# Patient Record
Sex: MALE | Race: WHITE | NOT HISPANIC OR LATINO | Employment: OTHER | URBAN - METROPOLITAN AREA
[De-identification: names, ages, dates, MRNs, and addresses within clinical notes are randomized per-mention and may not be internally consistent; named-entity substitution may affect disease eponyms.]

---

## 2017-01-13 ENCOUNTER — HOSPITAL ENCOUNTER (EMERGENCY)
Facility: HOSPITAL | Age: 77
Discharge: HOME | End: 2017-01-13
Attending: EMERGENCY MEDICINE | Admitting: EMERGENCY MEDICINE
Payer: MEDICARE

## 2017-01-13 ENCOUNTER — APPOINTMENT (EMERGENCY)
Dept: RADIOLOGY | Facility: HOSPITAL | Age: 77
End: 2017-01-13
Payer: MEDICARE

## 2017-01-13 ENCOUNTER — APPOINTMENT (EMERGENCY)
Dept: CT IMAGING | Facility: HOSPITAL | Age: 77
End: 2017-01-13
Payer: MEDICARE

## 2017-01-13 VITALS
HEIGHT: 66 IN | WEIGHT: 175 LBS | OXYGEN SATURATION: 98 % | BODY MASS INDEX: 28.12 KG/M2 | RESPIRATION RATE: 12 BRPM | DIASTOLIC BLOOD PRESSURE: 75 MMHG | TEMPERATURE: 98.4 F | SYSTOLIC BLOOD PRESSURE: 154 MMHG | HEART RATE: 63 BPM

## 2017-01-13 DIAGNOSIS — M25.512 LEFT SHOULDER PAIN: ICD-10-CM

## 2017-01-13 DIAGNOSIS — R07.9 CHEST PAIN: Primary | ICD-10-CM

## 2017-01-13 LAB
ANION GAP SERPL CALCULATED.3IONS-SCNC: 9 MMOL/L (ref 4–13)
ATRIAL RATE: 81 BPM
BASOPHILS # BLD AUTO: 0.04 THOUSANDS/ΜL (ref 0–0.1)
BASOPHILS NFR BLD AUTO: 1 % (ref 0–1)
BUN SERPL-MCNC: 17 MG/DL (ref 5–25)
CALCIUM SERPL-MCNC: 9.4 MG/DL (ref 8.3–10.1)
CHLORIDE SERPL-SCNC: 101 MMOL/L (ref 100–108)
CO2 SERPL-SCNC: 29 MMOL/L (ref 21–32)
CREAT SERPL-MCNC: 1.15 MG/DL (ref 0.6–1.3)
EOSINOPHIL # BLD AUTO: 0.28 THOUSAND/ΜL (ref 0–0.61)
EOSINOPHIL NFR BLD AUTO: 3 % (ref 0–6)
ERYTHROCYTE [DISTWIDTH] IN BLOOD BY AUTOMATED COUNT: 16 % (ref 11.6–15.1)
GFR SERPL CREATININE-BSD FRML MDRD: >60 ML/MIN/1.73SQ M
GLUCOSE SERPL-MCNC: 116 MG/DL (ref 65–140)
HCT VFR BLD AUTO: 46.7 % (ref 36.5–49.3)
HGB BLD-MCNC: 15.2 G/DL (ref 12–17)
LYMPHOCYTES # BLD AUTO: 2.27 THOUSANDS/ΜL (ref 0.6–4.47)
LYMPHOCYTES NFR BLD AUTO: 27 % (ref 14–44)
MCH RBC QN AUTO: 24.9 PG (ref 26.8–34.3)
MCHC RBC AUTO-ENTMCNC: 32.5 G/DL (ref 31.4–37.4)
MCV RBC AUTO: 76 FL (ref 82–98)
MONOCYTES # BLD AUTO: 0.95 THOUSAND/ΜL (ref 0.17–1.22)
MONOCYTES NFR BLD AUTO: 11 % (ref 4–12)
NEUTROPHILS # BLD AUTO: 4.98 THOUSANDS/ΜL (ref 1.85–7.62)
NEUTS SEG NFR BLD AUTO: 58 % (ref 43–75)
P AXIS: 59 DEGREES
PLATELET # BLD AUTO: 229 THOUSANDS/UL (ref 149–390)
PMV BLD AUTO: 12 FL (ref 8.9–12.7)
POTASSIUM SERPL-SCNC: 4.2 MMOL/L (ref 3.5–5.3)
PR INTERVAL: 154 MS
QRS AXIS: -72 DEGREES
QRSD INTERVAL: 88 MS
QT INTERVAL: 382 MS
QTC INTERVAL: 443 MS
RBC # BLD AUTO: 6.11 MILLION/UL (ref 3.88–5.62)
SODIUM SERPL-SCNC: 139 MMOL/L (ref 136–145)
T WAVE AXIS: 36 DEGREES
TROPONIN I SERPL-MCNC: <0.02 NG/ML
VENTRICULAR RATE: 81 BPM
WBC # BLD AUTO: 8.52 THOUSAND/UL (ref 4.31–10.16)

## 2017-01-13 PROCEDURE — 84484 ASSAY OF TROPONIN QUANT: CPT | Performed by: EMERGENCY MEDICINE

## 2017-01-13 PROCEDURE — 85025 COMPLETE CBC W/AUTO DIFF WBC: CPT | Performed by: EMERGENCY MEDICINE

## 2017-01-13 PROCEDURE — 71275 CT ANGIOGRAPHY CHEST: CPT

## 2017-01-13 PROCEDURE — 71020 HB CHEST X-RAY 2VW FRONTAL&LATL: CPT

## 2017-01-13 PROCEDURE — 99285 EMERGENCY DEPT VISIT HI MDM: CPT

## 2017-01-13 PROCEDURE — 36415 COLL VENOUS BLD VENIPUNCTURE: CPT | Performed by: EMERGENCY MEDICINE

## 2017-01-13 PROCEDURE — 93005 ELECTROCARDIOGRAM TRACING: CPT | Performed by: EMERGENCY MEDICINE

## 2017-01-13 PROCEDURE — 80048 BASIC METABOLIC PNL TOTAL CA: CPT | Performed by: EMERGENCY MEDICINE

## 2017-01-13 RX ORDER — CARVEDILOL 12.5 MG/1
12.5 TABLET ORAL 2 TIMES DAILY WITH MEALS
COMMUNITY
End: 2018-11-15 | Stop reason: SDUPTHER

## 2017-01-13 RX ADMIN — IOHEXOL 70 ML: 350 INJECTION, SOLUTION INTRAVENOUS at 16:36

## 2017-01-13 RX ADMIN — SODIUM CHLORIDE 1000 ML: 0.9 INJECTION, SOLUTION INTRAVENOUS at 17:22

## 2017-01-25 ENCOUNTER — HOSPITAL ENCOUNTER (OUTPATIENT)
Facility: HOSPITAL | Age: 77
Setting detail: OUTPATIENT SURGERY
Discharge: HOME | End: 2017-01-25
Attending: INTERNAL MEDICINE | Admitting: INTERNAL MEDICINE
Payer: MEDICARE

## 2017-01-25 ENCOUNTER — ANESTHESIA EVENT (OUTPATIENT)
Dept: GASTROENTEROLOGY | Facility: HOSPITAL | Age: 77
End: 2017-01-25
Payer: MEDICARE

## 2017-01-25 ENCOUNTER — ANESTHESIA (OUTPATIENT)
Dept: GASTROENTEROLOGY | Facility: HOSPITAL | Age: 77
End: 2017-01-25
Payer: MEDICARE

## 2017-01-25 VITALS
TEMPERATURE: 98.4 F | RESPIRATION RATE: 15 BRPM | SYSTOLIC BLOOD PRESSURE: 110 MMHG | OXYGEN SATURATION: 95 % | HEART RATE: 61 BPM | BODY MASS INDEX: 28.12 KG/M2 | HEIGHT: 66 IN | WEIGHT: 175 LBS | DIASTOLIC BLOOD PRESSURE: 61 MMHG

## 2017-01-25 DIAGNOSIS — K92.1 MELENA: ICD-10-CM

## 2017-01-25 DIAGNOSIS — K59.00 CONSTIPATION: ICD-10-CM

## 2017-01-25 DIAGNOSIS — K21.9 GERD (GASTROESOPHAGEAL REFLUX DISEASE): ICD-10-CM

## 2017-01-25 PROCEDURE — 88305 TISSUE EXAM BY PATHOLOGIST: CPT | Performed by: INTERNAL MEDICINE

## 2017-01-25 RX ORDER — SODIUM CHLORIDE 9 MG/ML
125 INJECTION, SOLUTION INTRAVENOUS CONTINUOUS
Status: DISCONTINUED | OUTPATIENT
Start: 2017-01-25 | End: 2017-01-30 | Stop reason: HOSPADM

## 2017-01-25 RX ORDER — SODIUM CHLORIDE 9 MG/ML
125 INJECTION, SOLUTION INTRAVENOUS CONTINUOUS
Status: CANCELLED | OUTPATIENT
Start: 2017-01-25

## 2017-01-25 RX ORDER — PROPOFOL 10 MG/ML
INJECTION, EMULSION INTRAVENOUS AS NEEDED
Status: DISCONTINUED | OUTPATIENT
Start: 2017-01-25 | End: 2017-01-25 | Stop reason: SURG

## 2017-01-25 RX ORDER — PROPOFOL 10 MG/ML
INJECTION, EMULSION INTRAVENOUS CONTINUOUS PRN
Status: DISCONTINUED | OUTPATIENT
Start: 2017-01-25 | End: 2017-01-25 | Stop reason: SURG

## 2017-01-25 RX ORDER — ONDANSETRON 2 MG/ML
4 INJECTION INTRAMUSCULAR; INTRAVENOUS ONCE
Status: DISCONTINUED | OUTPATIENT
Start: 2017-01-25 | End: 2017-01-30 | Stop reason: HOSPADM

## 2017-01-25 RX ADMIN — SODIUM CHLORIDE: 0.9 INJECTION, SOLUTION INTRAVENOUS at 14:27

## 2017-01-25 RX ADMIN — PROPOFOL 100 MCG/KG/MIN: 10 INJECTION, EMULSION INTRAVENOUS at 14:28

## 2017-01-25 RX ADMIN — LIDOCAINE HYDROCHLORIDE 100 MG: 20 INJECTION, SOLUTION INTRAVENOUS at 14:27

## 2017-01-25 RX ADMIN — PROPOFOL 100 MG: 10 INJECTION, EMULSION INTRAVENOUS at 14:28

## 2017-04-11 ENCOUNTER — ALLSCRIPTS OFFICE VISIT (OUTPATIENT)
Dept: OTHER | Facility: OTHER | Age: 77
End: 2017-04-11

## 2017-09-11 DIAGNOSIS — I25.10 ATHEROSCLEROTIC HEART DISEASE OF NATIVE CORONARY ARTERY WITHOUT ANGINA PECTORIS: ICD-10-CM

## 2017-10-03 ENCOUNTER — HOSPITAL ENCOUNTER (INPATIENT)
Facility: HOSPITAL | Age: 77
LOS: 1 days | Discharge: HOME | DRG: 392 | End: 2017-10-05
Attending: EMERGENCY MEDICINE | Admitting: INTERNAL MEDICINE
Payer: MEDICARE

## 2017-10-03 ENCOUNTER — APPOINTMENT (EMERGENCY)
Dept: RADIOLOGY | Facility: HOSPITAL | Age: 77
DRG: 392 | End: 2017-10-03
Payer: MEDICARE

## 2017-10-03 ENCOUNTER — APPOINTMENT (EMERGENCY)
Dept: CT IMAGING | Facility: HOSPITAL | Age: 77
DRG: 392 | End: 2017-10-03
Payer: MEDICARE

## 2017-10-03 ENCOUNTER — GENERIC CONVERSION - ENCOUNTER (OUTPATIENT)
Dept: OTHER | Facility: OTHER | Age: 77
End: 2017-10-03

## 2017-10-03 DIAGNOSIS — I20.0 UNSTABLE ANGINA (HCC): Primary | ICD-10-CM

## 2017-10-03 LAB
ALBUMIN SERPL BCP-MCNC: 3.6 G/DL (ref 3.5–5)
ALBUMIN SERPL BCP-MCNC: 3.6 G/DL (ref 3.5–5)
ALP SERPL-CCNC: 49 U/L (ref 46–116)
ALP SERPL-CCNC: 53 U/L (ref 46–116)
ALT SERPL W P-5'-P-CCNC: 28 U/L (ref 12–78)
ALT SERPL W P-5'-P-CCNC: 29 U/L (ref 12–78)
ANION GAP SERPL CALCULATED.3IONS-SCNC: 6 MMOL/L (ref 4–13)
APTT PPP: 31 SECONDS (ref 23–35)
AST SERPL W P-5'-P-CCNC: 16 U/L (ref 5–45)
AST SERPL W P-5'-P-CCNC: 16 U/L (ref 5–45)
BASOPHILS # BLD AUTO: 0.05 THOUSANDS/ΜL (ref 0–0.1)
BASOPHILS NFR BLD AUTO: 1 % (ref 0–1)
BILIRUB DIRECT SERPL-MCNC: 0.13 MG/DL (ref 0–0.2)
BILIRUB SERPL-MCNC: 0.4 MG/DL (ref 0.2–1)
BILIRUB SERPL-MCNC: 0.4 MG/DL (ref 0.2–1)
BUN SERPL-MCNC: 19 MG/DL (ref 5–25)
CALCIUM SERPL-MCNC: 9 MG/DL (ref 8.3–10.1)
CHLORIDE SERPL-SCNC: 106 MMOL/L (ref 100–108)
CO2 SERPL-SCNC: 29 MMOL/L (ref 21–32)
CREAT SERPL-MCNC: 1.13 MG/DL (ref 0.6–1.3)
EOSINOPHIL # BLD AUTO: 0.48 THOUSAND/ΜL (ref 0–0.61)
EOSINOPHIL NFR BLD AUTO: 5 % (ref 0–6)
ERYTHROCYTE [DISTWIDTH] IN BLOOD BY AUTOMATED COUNT: 17.7 % (ref 11.6–15.1)
GFR SERPL CREATININE-BSD FRML MDRD: 62 ML/MIN/1.73SQ M
GLUCOSE SERPL-MCNC: 99 MG/DL (ref 65–140)
HCT VFR BLD AUTO: 42.9 % (ref 36.5–49.3)
HGB BLD-MCNC: 14.2 G/DL (ref 12–17)
INR PPP: 0.98 (ref 0.86–1.16)
LIPASE SERPL-CCNC: 80 U/L (ref 73–393)
LYMPHOCYTES # BLD AUTO: 2.43 THOUSANDS/ΜL (ref 0.6–4.47)
LYMPHOCYTES NFR BLD AUTO: 27 % (ref 14–44)
MCH RBC QN AUTO: 25.3 PG (ref 26.8–34.3)
MCHC RBC AUTO-ENTMCNC: 33.1 G/DL (ref 31.4–37.4)
MCV RBC AUTO: 77 FL (ref 82–98)
MONOCYTES # BLD AUTO: 0.98 THOUSAND/ΜL (ref 0.17–1.22)
MONOCYTES NFR BLD AUTO: 11 % (ref 4–12)
NEUTROPHILS # BLD AUTO: 5 THOUSANDS/ΜL (ref 1.85–7.62)
NEUTS SEG NFR BLD AUTO: 56 % (ref 43–75)
PLATELET # BLD AUTO: 185 THOUSANDS/UL (ref 149–390)
PMV BLD AUTO: 12.6 FL (ref 8.9–12.7)
POTASSIUM SERPL-SCNC: 3.5 MMOL/L (ref 3.5–5.3)
PROT SERPL-MCNC: 7.7 G/DL (ref 6.4–8.2)
PROT SERPL-MCNC: 7.8 G/DL (ref 6.4–8.2)
PROTHROMBIN TIME: 12.8 SECONDS (ref 12.1–14.4)
RBC # BLD AUTO: 5.61 MILLION/UL (ref 3.88–5.62)
SODIUM SERPL-SCNC: 141 MMOL/L (ref 136–145)
TROPONIN I SERPL-MCNC: 0.03 NG/ML
WBC # BLD AUTO: 8.94 THOUSAND/UL (ref 4.31–10.16)

## 2017-10-03 PROCEDURE — 83690 ASSAY OF LIPASE: CPT | Performed by: EMERGENCY MEDICINE

## 2017-10-03 PROCEDURE — 80053 COMPREHEN METABOLIC PANEL: CPT | Performed by: EMERGENCY MEDICINE

## 2017-10-03 PROCEDURE — 84484 ASSAY OF TROPONIN QUANT: CPT | Performed by: EMERGENCY MEDICINE

## 2017-10-03 PROCEDURE — 71275 CT ANGIOGRAPHY CHEST: CPT

## 2017-10-03 PROCEDURE — 74175 CTA ABDOMEN W/CONTRAST: CPT

## 2017-10-03 PROCEDURE — 36415 COLL VENOUS BLD VENIPUNCTURE: CPT | Performed by: EMERGENCY MEDICINE

## 2017-10-03 PROCEDURE — 85025 COMPLETE CBC W/AUTO DIFF WBC: CPT | Performed by: EMERGENCY MEDICINE

## 2017-10-03 PROCEDURE — 85610 PROTHROMBIN TIME: CPT | Performed by: EMERGENCY MEDICINE

## 2017-10-03 PROCEDURE — 80076 HEPATIC FUNCTION PANEL: CPT | Performed by: EMERGENCY MEDICINE

## 2017-10-03 PROCEDURE — 93005 ELECTROCARDIOGRAM TRACING: CPT | Performed by: EMERGENCY MEDICINE

## 2017-10-03 PROCEDURE — 71020 HB CHEST X-RAY 2VW FRONTAL&LATL: CPT

## 2017-10-03 PROCEDURE — 85730 THROMBOPLASTIN TIME PARTIAL: CPT | Performed by: EMERGENCY MEDICINE

## 2017-10-03 RX ORDER — CLOPIDOGREL BISULFATE 75 MG/1
TABLET ORAL
COMMUNITY
Start: 2014-12-15 | End: 2017-10-03 | Stop reason: SDUPTHER

## 2017-10-03 RX ORDER — NICOTINE POLACRILEX 4 MG/1
GUM, CHEWING ORAL
Status: ON HOLD | COMMUNITY
Start: 2012-12-20 | End: 2017-10-05

## 2017-10-03 RX ORDER — CARVEDILOL 12.5 MG/1
TABLET ORAL
COMMUNITY
Start: 2016-09-07 | End: 2017-10-03 | Stop reason: SDUPTHER

## 2017-10-03 RX ORDER — AMLODIPINE BESYLATE AND BENAZEPRIL HYDROCHLORIDE 5; 40 MG/1; MG/1
CAPSULE ORAL
COMMUNITY
Start: 2016-08-17 | End: 2018-04-16 | Stop reason: SDUPTHER

## 2017-10-03 RX ORDER — LABETALOL HYDROCHLORIDE 5 MG/ML
10 INJECTION, SOLUTION INTRAVENOUS ONCE
Status: DISCONTINUED | OUTPATIENT
Start: 2017-10-03 | End: 2017-10-05 | Stop reason: HOSPADM

## 2017-10-03 RX ORDER — FUROSEMIDE 20 MG/1
TABLET ORAL
COMMUNITY
Start: 2016-08-17 | End: 2017-10-03 | Stop reason: SDUPTHER

## 2017-10-03 RX ORDER — PRAVASTATIN SODIUM 80 MG/1
TABLET ORAL
COMMUNITY
Start: 2012-08-27 | End: 2017-10-03 | Stop reason: SDUPTHER

## 2017-10-03 RX ORDER — ISOSORBIDE MONONITRATE 10 MG/1
TABLET ORAL
COMMUNITY
Start: 2014-04-15 | End: 2017-10-03 | Stop reason: SDUPTHER

## 2017-10-03 RX ORDER — ASPIRIN 81 MG/1
243 TABLET, CHEWABLE ORAL ONCE
Status: COMPLETED | OUTPATIENT
Start: 2017-10-03 | End: 2017-10-03

## 2017-10-03 RX ADMIN — IOHEXOL 100 ML: 350 INJECTION, SOLUTION INTRAVENOUS at 23:05

## 2017-10-03 RX ADMIN — ASPIRIN 81 MG 243 MG: 81 TABLET ORAL at 22:52

## 2017-10-04 ENCOUNTER — APPOINTMENT (INPATIENT)
Dept: ULTRASOUND IMAGING | Facility: HOSPITAL | Age: 77
DRG: 392 | End: 2017-10-04
Payer: MEDICARE

## 2017-10-04 ENCOUNTER — APPOINTMENT (INPATIENT)
Dept: NON INVASIVE DIAGNOSTICS | Facility: HOSPITAL | Age: 77
DRG: 392 | End: 2017-10-04
Payer: MEDICARE

## 2017-10-04 ENCOUNTER — GENERIC CONVERSION - ENCOUNTER (OUTPATIENT)
Dept: OTHER | Facility: OTHER | Age: 77
End: 2017-10-04

## 2017-10-04 PROBLEM — E78.5 HYPERLIPIDEMIA: Chronic | Status: ACTIVE | Noted: 2017-10-04

## 2017-10-04 PROBLEM — I10 HYPERTENSION: Chronic | Status: ACTIVE | Noted: 2017-10-04

## 2017-10-04 PROBLEM — I20.0 UNSTABLE ANGINA (HCC): Status: ACTIVE | Noted: 2017-10-04

## 2017-10-04 PROBLEM — K21.9 GERD (GASTROESOPHAGEAL REFLUX DISEASE): Chronic | Status: ACTIVE | Noted: 2017-10-04

## 2017-10-04 LAB
ALBUMIN SERPL BCP-MCNC: 3 G/DL (ref 3.5–5)
ALP SERPL-CCNC: 43 U/L (ref 46–116)
ALT SERPL W P-5'-P-CCNC: 23 U/L (ref 12–78)
ANION GAP SERPL CALCULATED.3IONS-SCNC: 7 MMOL/L (ref 4–13)
APTT PPP: 61 SECONDS (ref 23–35)
APTT PPP: 76 SECONDS (ref 23–35)
AST SERPL W P-5'-P-CCNC: 17 U/L (ref 5–45)
ATRIAL RATE: 65 BPM
ATRIAL RATE: 66 BPM
ATRIAL RATE: 67 BPM
ATRIAL RATE: 75 BPM
BILIRUB SERPL-MCNC: 0.4 MG/DL (ref 0.2–1)
BUN SERPL-MCNC: 17 MG/DL (ref 5–25)
CALCIUM SERPL-MCNC: 8.7 MG/DL (ref 8.3–10.1)
CHLORIDE SERPL-SCNC: 107 MMOL/L (ref 100–108)
CHOLEST SERPL-MCNC: 104 MG/DL (ref 50–200)
CO2 SERPL-SCNC: 27 MMOL/L (ref 21–32)
CREAT SERPL-MCNC: 0.91 MG/DL (ref 0.6–1.3)
ERYTHROCYTE [DISTWIDTH] IN BLOOD BY AUTOMATED COUNT: 17.5 % (ref 11.6–15.1)
GFR SERPL CREATININE-BSD FRML MDRD: 81 ML/MIN/1.73SQ M
GLUCOSE SERPL-MCNC: 114 MG/DL (ref 65–140)
HCT VFR BLD AUTO: 40.7 % (ref 36.5–49.3)
HDLC SERPL-MCNC: 48 MG/DL (ref 40–60)
HGB BLD-MCNC: 13.4 G/DL (ref 12–17)
LDLC SERPL CALC-MCNC: 41 MG/DL (ref 0–100)
MAGNESIUM SERPL-MCNC: 2 MG/DL (ref 1.6–2.6)
MCH RBC QN AUTO: 24.8 PG (ref 26.8–34.3)
MCHC RBC AUTO-ENTMCNC: 32.9 G/DL (ref 31.4–37.4)
MCV RBC AUTO: 75 FL (ref 82–98)
P AXIS: 41 DEGREES
P AXIS: 57 DEGREES
P AXIS: 61 DEGREES
P AXIS: 70 DEGREES
PHOSPHATE SERPL-MCNC: 2.9 MG/DL (ref 2.3–4.1)
PLATELET # BLD AUTO: 182 THOUSANDS/UL (ref 149–390)
PMV BLD AUTO: 12.3 FL (ref 8.9–12.7)
POTASSIUM SERPL-SCNC: 3.4 MMOL/L (ref 3.5–5.3)
PR INTERVAL: 160 MS
PR INTERVAL: 166 MS
PR INTERVAL: 168 MS
PR INTERVAL: 178 MS
PROT SERPL-MCNC: 6.6 G/DL (ref 6.4–8.2)
QRS AXIS: -42 DEGREES
QRS AXIS: -43 DEGREES
QRS AXIS: -55 DEGREES
QRS AXIS: -55 DEGREES
QRSD INTERVAL: 84 MS
QRSD INTERVAL: 90 MS
QT INTERVAL: 418 MS
QT INTERVAL: 418 MS
QT INTERVAL: 424 MS
QT INTERVAL: 426 MS
QTC INTERVAL: 441 MS
QTC INTERVAL: 443 MS
QTC INTERVAL: 444 MS
QTC INTERVAL: 466 MS
RBC # BLD AUTO: 5.41 MILLION/UL (ref 3.88–5.62)
SODIUM SERPL-SCNC: 141 MMOL/L (ref 136–145)
T WAVE AXIS: 14 DEGREES
T WAVE AXIS: 26 DEGREES
T WAVE AXIS: 4 DEGREES
T WAVE AXIS: 48 DEGREES
TRIGL SERPL-MCNC: 75 MG/DL
TROPONIN I SERPL-MCNC: 0.02 NG/ML
TROPONIN I SERPL-MCNC: <0.02 NG/ML
TROPONIN I SERPL-MCNC: <0.02 NG/ML
VENTRICULAR RATE: 65 BPM
VENTRICULAR RATE: 66 BPM
VENTRICULAR RATE: 67 BPM
VENTRICULAR RATE: 75 BPM
WBC # BLD AUTO: 8.41 THOUSAND/UL (ref 4.31–10.16)

## 2017-10-04 PROCEDURE — 93005 ELECTROCARDIOGRAM TRACING: CPT | Performed by: EMERGENCY MEDICINE

## 2017-10-04 PROCEDURE — 84100 ASSAY OF PHOSPHORUS: CPT | Performed by: NURSE PRACTITIONER

## 2017-10-04 PROCEDURE — 85027 COMPLETE CBC AUTOMATED: CPT | Performed by: NURSE PRACTITIONER

## 2017-10-04 PROCEDURE — 83735 ASSAY OF MAGNESIUM: CPT | Performed by: NURSE PRACTITIONER

## 2017-10-04 PROCEDURE — 84484 ASSAY OF TROPONIN QUANT: CPT | Performed by: NURSE PRACTITIONER

## 2017-10-04 PROCEDURE — 93005 ELECTROCARDIOGRAM TRACING: CPT | Performed by: NURSE PRACTITIONER

## 2017-10-04 PROCEDURE — 76770 US EXAM ABDO BACK WALL COMP: CPT

## 2017-10-04 PROCEDURE — 80061 LIPID PANEL: CPT | Performed by: NURSE PRACTITIONER

## 2017-10-04 PROCEDURE — 80053 COMPREHEN METABOLIC PANEL: CPT | Performed by: NURSE PRACTITIONER

## 2017-10-04 PROCEDURE — 84484 ASSAY OF TROPONIN QUANT: CPT | Performed by: EMERGENCY MEDICINE

## 2017-10-04 PROCEDURE — 99285 EMERGENCY DEPT VISIT HI MDM: CPT

## 2017-10-04 PROCEDURE — 36415 COLL VENOUS BLD VENIPUNCTURE: CPT | Performed by: EMERGENCY MEDICINE

## 2017-10-04 PROCEDURE — 85730 THROMBOPLASTIN TIME PARTIAL: CPT | Performed by: NURSE PRACTITIONER

## 2017-10-04 PROCEDURE — 93306 TTE W/DOPPLER COMPLETE: CPT

## 2017-10-04 RX ORDER — CARVEDILOL 12.5 MG/1
12.5 TABLET ORAL 2 TIMES DAILY WITH MEALS
Status: DISCONTINUED | OUTPATIENT
Start: 2017-10-04 | End: 2017-10-05 | Stop reason: HOSPADM

## 2017-10-04 RX ORDER — PRAVASTATIN SODIUM 80 MG/1
80 TABLET ORAL
Status: DISCONTINUED | OUTPATIENT
Start: 2017-10-04 | End: 2017-10-05 | Stop reason: HOSPADM

## 2017-10-04 RX ORDER — ACETAMINOPHEN 325 MG/1
650 TABLET ORAL EVERY 4 HOURS PRN
Status: DISCONTINUED | OUTPATIENT
Start: 2017-10-04 | End: 2017-10-05 | Stop reason: HOSPADM

## 2017-10-04 RX ORDER — ONDANSETRON 2 MG/ML
4 INJECTION INTRAMUSCULAR; INTRAVENOUS EVERY 6 HOURS PRN
Status: DISCONTINUED | OUTPATIENT
Start: 2017-10-04 | End: 2017-10-05 | Stop reason: HOSPADM

## 2017-10-04 RX ORDER — HEPARIN SODIUM 1000 [USP'U]/ML
2000 INJECTION, SOLUTION INTRAVENOUS; SUBCUTANEOUS AS NEEDED
Status: DISCONTINUED | OUTPATIENT
Start: 2017-10-04 | End: 2017-10-04

## 2017-10-04 RX ORDER — HEPARIN SODIUM 1000 [USP'U]/ML
4000 INJECTION, SOLUTION INTRAVENOUS; SUBCUTANEOUS AS NEEDED
Status: DISCONTINUED | OUTPATIENT
Start: 2017-10-04 | End: 2017-10-04

## 2017-10-04 RX ORDER — NITROGLYCERIN 0.4 MG/1
0.4 TABLET SUBLINGUAL
Status: DISCONTINUED | OUTPATIENT
Start: 2017-10-04 | End: 2017-10-05 | Stop reason: HOSPADM

## 2017-10-04 RX ORDER — PANTOPRAZOLE SODIUM 20 MG/1
20 TABLET, DELAYED RELEASE ORAL
Status: DISCONTINUED | OUTPATIENT
Start: 2017-10-04 | End: 2017-10-05 | Stop reason: HOSPADM

## 2017-10-04 RX ORDER — CLOPIDOGREL BISULFATE 75 MG/1
75 TABLET ORAL DAILY
Status: DISCONTINUED | OUTPATIENT
Start: 2017-10-04 | End: 2017-10-05 | Stop reason: HOSPADM

## 2017-10-04 RX ORDER — HEPARIN SODIUM 10000 [USP'U]/100ML
3-20 INJECTION, SOLUTION INTRAVENOUS
Status: DISCONTINUED | OUTPATIENT
Start: 2017-10-04 | End: 2017-10-04

## 2017-10-04 RX ORDER — HEPARIN SODIUM 1000 [USP'U]/ML
4000 INJECTION, SOLUTION INTRAVENOUS; SUBCUTANEOUS ONCE
Status: COMPLETED | OUTPATIENT
Start: 2017-10-04 | End: 2017-10-04

## 2017-10-04 RX ORDER — ISOSORBIDE MONONITRATE 20 MG/1
10 TABLET ORAL 3 TIMES DAILY
Status: DISCONTINUED | OUTPATIENT
Start: 2017-10-04 | End: 2017-10-04

## 2017-10-04 RX ORDER — ASPIRIN 81 MG/1
325 TABLET, CHEWABLE ORAL DAILY
Status: DISCONTINUED | OUTPATIENT
Start: 2017-10-05 | End: 2017-10-04

## 2017-10-04 RX ORDER — ISOSORBIDE DINITRATE 5 MG/1
10 TABLET ORAL
Status: DISCONTINUED | OUTPATIENT
Start: 2017-10-04 | End: 2017-10-05 | Stop reason: HOSPADM

## 2017-10-04 RX ORDER — POTASSIUM CHLORIDE 20 MEQ/1
40 TABLET, EXTENDED RELEASE ORAL ONCE
Status: COMPLETED | OUTPATIENT
Start: 2017-10-04 | End: 2017-10-04

## 2017-10-04 RX ORDER — FENTANYL CITRATE 50 UG/ML
50 INJECTION, SOLUTION INTRAMUSCULAR; INTRAVENOUS ONCE
Status: COMPLETED | OUTPATIENT
Start: 2017-10-04 | End: 2017-10-04

## 2017-10-04 RX ORDER — NITROGLYCERIN 0.4 MG/1
TABLET SUBLINGUAL
Status: COMPLETED
Start: 2017-10-04 | End: 2017-10-04

## 2017-10-04 RX ORDER — CLOPIDOGREL BISULFATE 75 MG/1
75 TABLET ORAL DAILY
Status: DISCONTINUED | OUTPATIENT
Start: 2017-10-04 | End: 2017-10-04 | Stop reason: SDUPTHER

## 2017-10-04 RX ADMIN — POTASSIUM CHLORIDE 40 MEQ: 1500 TABLET, EXTENDED RELEASE ORAL at 10:48

## 2017-10-04 RX ADMIN — CLOPIDOGREL 75 MG: 75 TABLET, FILM COATED ORAL at 09:00

## 2017-10-04 RX ADMIN — CARVEDILOL 12.5 MG: 12.5 TABLET, FILM COATED ORAL at 10:46

## 2017-10-04 RX ADMIN — PANTOPRAZOLE SODIUM 20 MG: 20 TABLET, DELAYED RELEASE ORAL at 05:58

## 2017-10-04 RX ADMIN — PRAVASTATIN SODIUM 80 MG: 80 TABLET ORAL at 18:06

## 2017-10-04 RX ADMIN — CARVEDILOL 12.5 MG: 12.5 TABLET, FILM COATED ORAL at 16:36

## 2017-10-04 RX ADMIN — HEPARIN SODIUM AND DEXTROSE 12 UNITS/KG/HR: 10000; 5 INJECTION INTRAVENOUS at 00:33

## 2017-10-04 RX ADMIN — NITROGLYCERIN 0.4 MG: 0.4 TABLET SUBLINGUAL at 03:53

## 2017-10-04 RX ADMIN — HEPARIN SODIUM 4000 UNITS: 1000 INJECTION, SOLUTION INTRAVENOUS; SUBCUTANEOUS at 00:24

## 2017-10-04 RX ADMIN — NITROGLYCERIN 0.4 MG: 0.4 TABLET SUBLINGUAL at 03:35

## 2017-10-04 RX ADMIN — BENAZEPRIL HYDROCHLORIDE: 10 TABLET, FILM COATED ORAL at 09:00

## 2017-10-04 RX ADMIN — NITROGLYCERIN 0.4 MG: 0.4 TABLET, ORALLY DISINTEGRATING SUBLINGUAL at 03:35

## 2017-10-04 RX ADMIN — ISOSORBIDE MONONITRATE 10 MG: 20 TABLET, COATED ORAL at 09:00

## 2017-10-04 RX ADMIN — ISOSORBIDE DINITRATE 10 MG: 5 TABLET ORAL at 18:06

## 2017-10-04 RX ADMIN — FENTANYL CITRATE 50 MCG: 50 INJECTION, SOLUTION INTRAMUSCULAR; INTRAVENOUS at 02:21

## 2017-10-04 NOTE — CASE MANAGEMENT
Initial Clinical Review    Admission: Date/Time/Statement: 10/4/17 @ 0000     Orders Placed This Encounter   Procedures    Inpatient Admission (expected length of stay for this patient is greater than two midnights)     Standing Status:   Standing     Number of Occurrences:   1     Order Specific Question:   Admitting Physician     Answer:   Charlee Brunner [[id number]]     Order Specific Question:   Level of Care     Answer:   Med Surg [[id number]]     Order Specific Question:   Estimated length of stay     Answer:   More than 2 Midnights     Order Specific Question:   Certification     Answer:   I certify that inpatient services are medically necessary for this patient for a duration of greater than two midnights  See H&P and MD Progress Notes for additional information about the patient's course of treatment  ED: Date/Time/Mode of Arrival:   ED Arrival Information     Expected Arrival Acuity Means of Arrival Escorted By Service Admission Type    - 10/3/2017 21:28 Emergent Walk-In Family Member General Medicine Emergency    Arrival Complaint    HIGH BP CHEST PAIN          Chief Complaint:   Chief Complaint   Patient presents with    Chest Pain     High BP all day, started with mid-sternal CP just pta, SOB earlier but now resolved   Hypertension       History of Illness: 65-year-old [place] man who speaks primarily Urdu  He presents to the emergency department with his son as a  and a chief complaint of left-sided chest pain that does not radiate, is not reproducible, is intermittent in nature however today it has been constant but waxing and waning  He states that he has been having this left-sided chest pain with walking or activity for approximately 1 month  It was relieved with rest however today he began to have the pain at rest and nothing relieved it  He was given nitroglycerin and it was not relieved  He describes the pain as sharp    Denies shortness of breath with the pain however he states that he often wakes in the middle of the night feeling short of breath  His son states that he does snore very loudly and has never been worked up for HUNTER  ED Vital Signs:   ED Triage Vitals   Temperature Pulse Respirations Blood Pressure SpO2   10/04/17 0015 10/03/17 2142 10/03/17 2142 10/03/17 2142 10/03/17 2142   (!) 96 7 °F (35 9 °C) 72 18 (!) 193/93 97 %      Temp Source Heart Rate Source Patient Position - Orthostatic VS BP Location FiO2 (%)   10/04/17 0015 10/03/17 2142 10/03/17 2142 10/03/17 2142 --   Tympanic Monitor Lying Right arm       Pain Score       10/03/17 2330       4        Wt Readings from Last 1 Encounters:   10/04/17 78 6 kg (173 lb 4 5 oz)       Vital Signs (abnormal):  Maximum /93  Temperature 96 7  Maximum respiratory rate 23  Abnormal Labs/Diagnostic Test Results:     cta chest and abdomen - No aortic dissection  2   Indeterminate left renal lesion for which follow-up ultrasound is recommended    Serial troponin negative  Labs 10/4/2017 - K 3 4  Alkaline phosphatase 43  Albumin 3 0  PTT 76    ED Treatment:   Medication Administration from 10/03/2017 2128 to 10/04/2017 8921       Date/Time Order Dose Route Action Action by Comments     10/03/2017 2252 aspirin chewable tablet 243 mg 243 mg Oral Given Jaswinder Hooper RN      10/03/2017 2245 labetalol (NORMODYNE) injection 10 mg 0 mg Intravenous Hold Jaswinder Hooper RN Verbal order from Dr Taina Arevalo to hold order  10/03/2017 2305 iohexol (OMNIPAQUE) 350 MG/ML injection (MULTI-DOSE) 100 mL 100 mL Intravenous Given Kurt Ybarra      10/04/2017 0040 heparin (ACS LOW)   Intravenous Not Given Jaswinder Hooper RN Order discontiuned by provider       10/04/2017 0024 heparin (porcine) injection 4,000 Units 4,000 Units Intravenous Given Jaswinder Hooper RN      10/04/2017 0302 heparin (porcine) 25,000 units in 250 mL infusion (premix) 3 Units/kg/hr Intravenous Continue to Inpatient [address], RN      10/04/2017 0033 heparin (porcine) 25,000 units in 250 mL infusion (premix) 12 Units/kg/hr Intravenous [address] Jaron Devine RN      10/04/2017 0221 fentanyl citrate (PF) 100 MCG/2ML 50 mcg 50 mcg Intravenous Given Jaron Devine RN           Past Medical/Surgical History: Active Ambulatory Problems     Diagnosis Date Noted    No Active Ambulatory Problems     Resolved Ambulatory Problems     Diagnosis Date Noted    No Resolved Ambulatory Problems     Past Medical History:   Diagnosis Date    Coronary artery disease     GERD (gastroesophageal reflux disease)     Hyperlipidemia     Hypertension     Myocardial infarction 2007       Admitting Diagnosis: Unstable angina ([address] ) [I20 0]  Chest pain in adult [R07 9]    Age/Sex: 67 y o  male    Assessment/Plan: Chest pain with cardiac history  Hypertension, hyperlipidemia and GERD    Hypokalemia  Patient's chest pain is pleuritic in nature  Patient CT chest and abdomen did not show any dissection  Will order renal ultrasound as they found left renal lesion  Continue on aspirin, Lotrel, Plavix, isosorbide, Pravachol and Coreg  Potassium supplementation  Patient was started on heparin drip in the ER  Follow as per protocol  Serial troponin  Cardiology consult  GI/DVT prophylaxis         Admission Orders:  TELE  10/4/2017  0001 INPATIENT   Scheduled Meds:   amLODIPine-benazepril (LOTREL 5/40) combo dose  Oral Daily   [START ON 10/5/2017] aspirin 325 mg Oral Daily   carvedilol 12 5 mg Oral BID With Meals   clopidogrel 75 mg Oral Daily   isosorbide mononitrate 10 mg Oral TID   labetalol 10 mg Intravenous Once   pantoprazole 20 mg Oral Early Morning   potassium chloride 40 mEq Oral Once   pravastatin 80 mg Oral Daily With Dinner     Continuous Infusions:   heparin (porcine) 3-20 Units/kg/hr (Order-Specific) Last Rate: 12 Units/kg/hr (10/04/17 0033)     PRN Meds:   acetaminophen    heparin (porcine)    heparin (porcine)    Nitroglycerin sl - used x 2      ondansetron    OTHER ORDERS:  scds  Renal us  Consult cardiology  echo

## 2017-10-04 NOTE — PLAN OF CARE
Problem: Potential for Falls  Goal: Patient will remain free of falls  INTERVENTIONS:  - Assess patient frequently for physical needs  -  Identify cognitive and physical deficits and behaviors that affect risk of falls    -  [place] fall precautions as indicated by assessment   - Educate patient/family on patient safety including physical limitations  - Instruct patient to call for assistance with activity based on assessment  - Modify environment to reduce risk of injury  - Consider OT/PT consult to assist with strengthening/mobility    Outcome: Progressing      Problem: Prexisting or High Potential for Compromised Skin Integrity  Goal: Skin integrity is maintained or improved  INTERVENTIONS:  - Identify patients at risk for skin breakdown  - Assess and monitor skin integrity  - Assess and monitor nutrition and hydration status  - Monitor labs (i e  albumin)  - Assess for incontinence   - Turn and reposition patient  - Assist with mobility/ambulation  - Relieve pressure over bony prominences  - Avoid friction and shearing  - Provide appropriate hygiene as needed including keeping skin clean and dry  - Evaluate need for skin moisturizer/barrier cream  - Collaborate with interdisciplinary team (i e  Nutrition, Rehabilitation, etc )   - Patient/family teaching   Outcome: Progressing      Problem: PAIN - ADULT  Goal: Verbalizes/displays adequate comfort level or baseline comfort level  Interventions:  - Encourage patient to monitor pain and request assistance  - Assess pain using appropriate pain scale  - Administer analgesics based on type and severity of pain and evaluate response  - Implement non-pharmacological measures as appropriate and evaluate response  - Consider cultural and social influences on pain and pain management  - Notify physician/advanced practitioner if interventions unsuccessful or patient reports new pain   Outcome: Progressing      Problem: INFECTION - ADULT  Goal: Absence or prevention of progression during hospitalization  INTERVENTIONS:  - Assess and monitor for signs and symptoms of infection  - Monitor lab/diagnostic results  - Monitor all insertion sites, i e  indwelling lines, tubes, and drains  - Monitor endotracheal (as able) and nasal secretions for changes in amount and color  - [place] appropriate cooling/warming therapies per order  - Administer medications as ordered  - Instruct and encourage patient and family to use good hand hygiene technique  - Identify and instruct in appropriate isolation precautions for identified infection/condition   Outcome: Progressing    Goal: Absence of fever/infection during neutropenic period  INTERVENTIONS:  - Monitor WBC  - Implement neutropenic guidelines   Outcome: Progressing      Problem: SAFETY ADULT  Goal: Maintain or return to baseline ADL function  INTERVENTIONS:  -  Assess patient's ability to carry out ADLs; assess patient's baseline for ADL function and identify physical deficits which impact ability to perform ADLs (bathing, care of mouth/teeth, toileting, grooming, dressing, etc )  - Assess/evaluate cause of self-care deficits   - Assess range of motion  - Assess patient's mobility; develop plan if impaired  - Assess patient's need for assistive devices and provide as appropriate  - Encourage maximum independence but intervene and supervise when necessary  ¯ Involve family in performance of ADLs  ¯ Assess for home care needs following discharge   ¯ Request OT consult to assist with ADL evaluation and planning for discharge  ¯ Provide patient education as appropriate   Outcome: Progressing    Goal: Maintain or return mobility status to optimal level  INTERVENTIONS:  - Assess patient's baseline mobility status (ambulation, transfers, stairs, etc )    - Identify cognitive and physical deficits and behaviors that affect mobility  - Identify mobility aids required to assist with transfers and/or ambulation (gait belt, sit-to-stand, lift, walker, cane, etc )  - [place] fall precautions as indicated by assessment  - Record patient progress and toleration of activity level on Mobility SBAR; progress patient to next Phase/Stage  - Instruct patient to call for assistance with activity based on assessment  - Request Rehabilitation consult to assist with strengthening/weightbearing, etc    Outcome: Progressing    Goal: Patient will remain free of falls  INTERVENTIONS:  - Assess patient frequently for physical needs  -  Identify cognitive and physical deficits and behaviors that affect risk of falls  -  [place] fall precautions as indicated by assessment   - Educate patient/family on patient safety including physical limitations  - Instruct patient to call for assistance with activity based on assessment  - Modify environment to reduce risk of injury  - Consider OT/PT consult to assist with strengthening/mobility    Outcome: Progressing      Problem: DISCHARGE PLANNING  Goal: Discharge to home or other facility with appropriate resources  INTERVENTIONS:  - Identify barriers to discharge w/patient and caregiver  - Arrange for needed discharge resources and transportation as appropriate  - Identify discharge learning needs (meds, wound care, etc )  - Arrange for interpretive services to assist at discharge as needed  - Refer to Case Management Department for coordinating discharge planning if the patient needs post-hospital services based on physician/advanced practitioner order or complex needs related to functional status, cognitive ability, or social support system   Outcome: Progressing      Problem: Knowledge Deficit  Goal: Patient/family/caregiver demonstrates understanding of disease process, treatment plan, medications, and discharge instructions  Complete learning assessment and assess knowledge base    Interventions:  - Provide teaching at level of understanding  - Provide teaching via preferred learning methods   Outcome: Progressing      Problem: CARDIOVASCULAR - ADULT  Goal: Maintains optimal cardiac output and hemodynamic stability  INTERVENTIONS:  - Monitor I/O, vital signs and rhythm  - Monitor for S/S and trends of decreased cardiac output i e  bleeding, hypotension  - Administer and titrate ordered vasoactive medications to optimize hemodynamic stability  - Assess quality of pulses, skin color and temperature  - Assess for signs of decreased coronary artery perfusion - ex  Angina  - Instruct patient to report change in severity of symptoms   Outcome: Progressing    Goal: Absence of cardiac dysrhythmias or at baseline rhythm  INTERVENTIONS:  - Continuous cardiac monitoring, monitor vital signs, obtain 12 lead EKG if indicated  - Administer antiarrhythmic and heart rate control medications as ordered  - Monitor electrolytes and administer replacement therapy as ordered   - Pt  Verbalizes understanding of the signs and symptoms of MI and doesn't wait too long to contact healthcare provider     Outcome: Progressing      Problem: SKIN/TISSUE INTEGRITY - ADULT  Goal: Skin integrity remains intact  INTERVENTIONS  - Identify patients at risk for skin breakdown  - Assess and monitor skin integrity  - Assess and monitor nutrition and hydration status  - Monitor labs (i e  albumin)  - Assess for incontinence   - Turn and reposition patient  - Assist with mobility/ambulation  - Relieve pressure over bony prominences  - Avoid friction and shearing  - Provide appropriate hygiene as needed including keeping skin clean and dry  - Evaluate need for skin moisturizer/barrier cream  - Collaborate with interdisciplinary team (i e  Nutrition, Rehabilitation, etc )   - Patient/family teaching   Outcome: Progressing      Problem: MUSCULOSKELETAL - ADULT  Goal: Maintain or return mobility to safest level of function  INTERVENTIONS:  - Assess patient's ability to carry out ADLs; assess patient's baseline for ADL function and identify physical deficits which impact ability to perform ADLs (bathing, care of mouth/teeth, toileting, grooming, dressing, etc )  - Assess/evaluate cause of self-care deficits   - Assess range of motion  - Assess patient's mobility; develop plan if impaired  - Assess patient's need for assistive devices and provide as appropriate  - Encourage maximum independence but intervene and supervise when necessary  - Involve family in performance of ADLs  - Assess for home care needs following discharge   - Request OT consult to assist with ADL evaluation and planning for discharge  - Provide patient education as appropriate   Outcome: Progressing    Goal: Maintain proper alignment of affected body part  INTERVENTIONS:  - Support, maintain and protect limb and body alignment  - Provide pt/fam with appropriate education   Outcome: Progressing

## 2017-10-04 NOTE — H&P
H&P Exam - Evangelina Hines 67 y o  male MRN: [id number]    Unit/Bed#: -01 Encounter: [id number]      Assessment:  Principal Problem:    Unstable angina ([address] )  Active Problems:    Hypertension    GERD (gastroesophageal reflux disease)    Hyperlipidemia  Resolved Problems:    * No resolved hospital problems  *      Plan:  1  Unstable angina with a history of inferior wall MI and cardiac stents  Cardiology consult serial troponins heparin drip patient is also on aspirin and Plavix  Is unclear at this point if this is cardiac in nature  He has had multiple workups for chest pain and recently they have been negative however with his significant history would like Cardiology input  2   Hypertension patient is on Coreg, isosorbide, and Lotrel  Was mildly hypertensive here however it is only mildly elevated so will trend before increasing his medications  3   Hyperlipidemia continue patient's Pravachol    4  GERD continue patient's Prilosec        History of Present Illness   Evangelina Hines is a 58-year-old [place] man who speaks primarily English  He presents to the emergency department with his son as a  and a chief complaint of left-sided chest pain that does not radiate, is not reproducible, is intermittent in nature however today it has been constant but waxing and waning  He states that he has been having this left-sided chest pain with walking or activity for approximately 1 month  It was relieved with rest however today he began to have the pain at rest and nothing relieved it  He was given nitroglycerin and it was not relieved  He describes the pain as sharp  Denies shortness of breath with the pain however he states that he often wakes in the middle of the night feeling short of breath  His son states that he does snore very loudly and has never been worked up for HNUTER  He sleeps on 2 pillows which he has always done    Denies palpitations nausea vomiting dizziness syncope leg edema or cough    Review of Systems   Respiratory: Negative for cough and shortness of breath  Cardiovascular: Positive for chest pain  Negative for palpitations  Gastrointestinal: Negative for nausea and vomiting  Historical Information   Past Medical History:   Diagnosis Date    Coronary artery disease     GERD (gastroesophageal reflux disease)     Hyperlipidemia     Hypertension     Myocardial infarction 2007    Inferior wall     Past Surgical History:   Procedure Laterality Date    CAROTID STENT      CHOLECYSTECTOMY      CORONARY STENT PLACEMENT      NE ESOPHAGOGASTRODUODENOSCOPY TRANSORAL DIAGNOSTIC N/A 1/25/2017    Procedure: EGD AND COLONOSCOPY;  Surgeon: Oumar Aguila MD;  Location: [facility];   Service: Gastroenterology     Social History   History   Alcohol Use No     History   Drug Use No     History   Smoking Status    Former Smoker   Smokeless Tobacco    Not on file     Family History   Problem Relation Age of Onset   Rick Paez Breast cancer Mother    Rick Paez Parkinsonism Father     Lymphoma Sister     Parkinsonism Brother     Prostate cancer Brother        Meds/Allergies   Meds and Allergies Reviewed     No Known Allergies    Scheduled Meds    amLODIPine-benazepril (LOTREL 5/40) combo dose  Oral Daily   [START ON 10/5/2017] aspirin 325 mg Oral Daily   carvedilol 12 5 mg Oral BID With Meals   clopidogrel 75 mg Oral Daily   isosorbide mononitrate 10 mg Oral TID   labetalol 10 mg Intravenous Once   pantoprazole 20 mg Oral Early Morning   pravastatin 80 mg Oral Daily With Dinner     Continuous Infusions    heparin (porcine) 3-20 Units/kg/hr (Order-Specific) Last Rate: 12 Units/kg/hr (10/04/17 0033)     PRN Meds    acetaminophen    heparin (porcine)    heparin (porcine)    nitroglycerin    ondansetron  Prescriptions Prior to Admission   Medication    amLODIPine-benazepril (LOTREL) 5-40 MG per capsule    Omeprazole 20 MG TBEC    aspirin 81 MG tablet    carvedilol (COREG) 12 5 mg tablet    clopidogrel (PLAVIX) 75 mg tablet    isosorbide mononitrate (ISMO,MONOKET) 10 MG tablet    pravastatin (PRAVACHOL) 80 mg tablet       Objective   First Vitals:   Blood Pressure: (!) 193/93 (10/03/17 2142)  Pulse: 72 (10/03/17 2142)  Temperature: (!) 96 7 °F (35 9 °C) (10/04/17 0015)  Temp Source: Tympanic (10/04/17 0015)  Respirations: 18 (10/03/17 2142)  Height: 5' 6" (167 6 cm) (10/04/17 0344)  Weight - Scale: 79 4 kg (175 lb) (10/03/17 2142)  SpO2: 97 % (10/03/17 2142)    Current Vitals:   Blood Pressure: 150/82 (10/04/17 0344)  Pulse: 70 (10/04/17 0344)  Temperature: (!) 96 3 °F (35 7 °C) (10/04/17 0344)  Temp Source: Tympanic (10/04/17 0344)  Respirations: 18 (10/04/17 0344)  Height: 5' 6" (167 6 cm) (10/04/17 0344)  Weight - Scale: 78 6 kg (173 lb 4 5 oz) (10/04/17 0344)  SpO2: 98 % (10/04/17 0344)  No intake or output data in the 24 hours ending 10/04/17 0407  Invasive Devices     Peripheral Intravenous Line            Peripheral IV 10/03/17 Right Antecubital less than 1 day    Peripheral IV 10/04/17 Right Arm less than 1 day              Physical Exam   Constitutional: He is oriented to person, place, and time  He appears well-developed  HENT:   Head: Normocephalic and atraumatic  Eyes: Conjunctivae and EOM are normal  Pupils are equal, round, and reactive to light  Neck: Normal range of motion  Neck supple  No JVD present  No tracheal deviation present  No thyromegaly present  Cardiovascular: Normal rate, regular rhythm, normal heart sounds and intact distal pulses  Pulmonary/Chest: Effort normal and breath sounds normal  No stridor  No respiratory distress  He exhibits no tenderness  Abdominal: Soft  Bowel sounds are normal  He exhibits no distension  There is no tenderness  Genitourinary: Penis normal    Musculoskeletal: Normal range of motion  He exhibits no edema, tenderness or deformity  Lymphadenopathy:     He has no cervical adenopathy     Neurological: He is alert and oriented to person, place, and time  He has normal reflexes  Skin: Skin is warm and dry  Psychiatric: He has a normal mood and affect  His behavior is normal    Nursing note and vitals reviewed  Lab Results: I have reviewed all films and/or reports available    Results from last 7 days  Lab Units 10/03/17  2158   WBC Thousand/uL 8 94   HEMOGLOBIN g/dL 14 2   HEMATOCRIT % 42 9   PLATELETS Thousands/uL 185   NEUTROS PCT % 56   LYMPHS PCT % 27   MONOS PCT % 11   EOS PCT % 5       Results from last 7 days  Lab Units 10/03/17  2158   SODIUM mmol/L 141   POTASSIUM mmol/L 3 5   CHLORIDE mmol/L 106   CO2 mmol/L 29   BUN mg/dL 19   CREATININE mg/dL 1 13   CALCIUM mg/dL 9 0   TOTAL PROTEIN g/dL 7 8  7 7   BILIRUBIN TOTAL mg/dL 0 40  0 40   ALK PHOS U/L 53  49   ALT U/L 28  29   AST U/L 16  16   GLUCOSE RANDOM mg/dL 99     Lab Results   Component Value Date    TROPONINI <0 02 10/04/2017         Results from last 7 days  Lab Units 10/03/17  2256   INR  0 98     Urinalysis: No results found for: COLORU, [address], [address], [address] [facility], [address] [facility], [place], [address] [facility], GLUCOSEU, KETONESU, BILIRUBINUR, BLOODU  No results found for: Ray Meuse, SPUTUMCULTUR      Imaging: I have reviewed all films and/or reports available  Chest x-ray  No active disease  CTA chest rule out PE or dissection  No PE, no dissection, moderate colonic fecal material    ECG 12 Lead Documentation  10/3/2017,2144 AM:  ECG reviewed by me  Interpretation: NSR     [id number]  [id number]  ST elevation none  ST depression none  Pacing none    Code Status: Level 1 - Full Code  Advance Directive and Living Will:        Based on this patients condition, I anticipate a greater than 2 midnight stay  This patient is admitted to inpatient status       KOBE Carter  10/4/2017,4:07 AM

## 2017-10-04 NOTE — ED NOTES
Patient resting in bed comfortably, family at bedside  Patient and family deny any current needs       Annabelle Kim RN  10/04/17 3197

## 2017-10-04 NOTE — PLAN OF CARE
CARDIOVASCULAR - ADULT     Maintains optimal cardiac output and hemodynamic stability Progressing     Absence of cardiac dysrhythmias or at baseline rhythm Progressing        DISCHARGE PLANNING     Discharge to home or other facility with appropriate resources Progressing        INFECTION - ADULT     Absence or prevention of progression during hospitalization Progressing     Absence of fever/infection during neutropenic period Progressing        Knowledge Deficit     Patient/family/caregiver demonstrates understanding of disease process, treatment plan, medications, and discharge instructions Progressing        MUSCULOSKELETAL - ADULT     Maintain or return mobility to safest level of function Progressing     Maintain proper alignment of affected body part Progressing        Nutrition/Hydration-ADULT     Nutrient/Hydration intake appropriate for improving, restoring or maintaining nutritional needs Progressing        PAIN - ADULT     Verbalizes/displays adequate comfort level or baseline comfort level Progressing        Potential for Falls     Patient will remain free of falls Progressing        Prexisting or High Potential for Compromised Skin Integrity     Skin integrity is maintained or improved Progressing        SAFETY ADULT     Maintain or return to baseline ADL function Progressing     Maintain or return mobility status to optimal level Progressing     Patient will remain free of falls Progressing        SKIN/TISSUE INTEGRITY - ADULT     Skin integrity remains intact Progressing

## 2017-10-04 NOTE — PHYSICIAN ADVISOR
Current patient class: Inpatient  The patient is currently on Hospital Day: 2      The patient was admitted to the hospital at 0000 on 10/4/17 for the following diagnosis:  Unstable angina ([address] ) [I20 0]  Chest pain in adult [R07 9]       There is documentation in the medical record of an expected length of stay of at least 2 midnights  The patient is therefore expected to satisfy the 2 midnight benchmark and given the 2 midnight presumption is appropriate for INPATIENT ADMISSION  Given this expectation of a satisfying stay, CMS instructs us that the patient is most often appropriate for inpatient admission under part A provided medical necessity is documented in the chart  After review of the relevant documentation, labs, vital signs and test results, the patient is appropriate for INPATIENT ADMISSION  Admission to the hospital as an inpatient is a complex decision making process which requires the practitioner to consider the patients presenting complaint, history and physical examination and all relevant testing  With this in mind, in this case, the patient was deemed appropriate for INPATIENT ADMISSION  After review of the documentation and testing available at the time of the admission I concur with this clinical determination of medical necessity  Rationale is as follows:     The patient is a 67 yrs old Male who presented to the ED at 10/3/2017  9:34 PM with a chief complaint of Chest Pain (High BP all day, started with mid-sternal CP just pta, SOB earlier but now resolved  ) and Hypertension    The patients vitals on arrival were ED Triage Vitals   Temperature Pulse Respirations Blood Pressure SpO2   10/04/17 0015 10/03/17 2142 10/03/17 2142 10/03/17 2142 10/03/17 2142   (!) 96 7 °F (35 9 °C) 72 18 (!) 193/93 97 %      Temp Source Heart Rate Source Patient Position - Orthostatic VS BP Location FiO2 (%)   10/04/17 0015 10/03/17 2142 10/03/17 2142 10/03/17 2142 --   Tympanic Monitor Lying Right arm       Pain Score       10/03/17 2330       4           Past Medical History:   Diagnosis Date    Coronary artery disease     GERD (gastroesophageal reflux disease)     Hyperlipidemia     Hypertension     Myocardial infarction 2007    Inferior wall     Past Surgical History:   Procedure Laterality Date    CAROTID STENT      CHOLECYSTECTOMY      CORONARY STENT PLACEMENT      [place] ESOPHAGOGASTRODUODENOSCOPY TRANSORAL DIAGNOSTIC N/A 1/25/2017    Procedure: EGD AND COLONOSCOPY;  Surgeon: Jewel Watts MD;  Location: [facility]; Service: Gastroenterology           Consults have been placed to:   IP CONSULT TO CARDIOLOGY    Vitals:    10/04/17 0749 10/04/17 1307 10/04/17 1537 10/04/17 1636   BP: 157/85 128/96 147/76 153/72   Pulse: 70 78 72 67   Resp: 18 22 22    Temp: (!) 96 4 °F (35 8 °C) 98 4 °F (36 9 °C) 98 6 °F (37 °C)    TempSrc: Tympanic Tympanic Oral    SpO2: 97% 96% 98%    Weight:       Height:           Most recent labs:    Recent Labs      10/03/17   2158  10/03/17   2256   10/04/17   0532  10/04/17   1102   WBC  8 94   --    --   8 41   --    HGB  14 2   --    --   13 4   --    HCT  42 9   --    --   40 7   --    PLT  185   --    --   182   --    K  3 5   --    --   3 4*   --    NA  141   --    --   141   --    CALCIUM  9 0   --    --   8 7   --    BUN  19   --    --   17   --    CREATININE  1 13   --    --   0 91   --    LIPASE  80   --    --    --    --    INR   --   0 98   --    --    --    TROPONINI  0 03   --    < >   --   0 02   AST  16  16   --    --   17   --    ALT  28  29   --    --   23   --    ALKPHOS  53  49   --    --   43*   --    BILITOT  0 40  0 40   --    --   0 40   --     < > = values in this interval not displayed         Scheduled Meds:  amLODIPine-benazepril (LOTREL 5/40) combo dose  Oral Daily   carvedilol 12 5 mg Oral BID With Meals   clopidogrel 75 mg Oral Daily   isosorbide dinitrate 10 mg Oral TID after meals   labetalol 10 mg Intravenous Once   pantoprazole 20 mg Oral Early Morning   pravastatin 80 mg Oral Daily With Dinner     Continuous Infusions:   PRN Meds:   acetaminophen    nitroglycerin    ondansetron

## 2017-10-04 NOTE — CONSULTS
Consultation - Cardiology   Willy Medina 67 y o  male MRN: [id number]  Unit/Bed#: -01 Encounter: [id number]    Assessment/Plan     Assessment:  Atypical Chest Pain  CAD      Plan:  His symptom only occurs with inspiration  Will check echo to exclude pericardial effusion  Otherwise treat with NSAIDs as needed  Continue remainder of medical therapy for CAD  Followup with Dr Salo Chaves  History of Present Illness   Physician Requesting Consult: Katelyn Campbell MD  Reason for Consult / Principal Problem: chest pain  HPI: Willy Medina is a 67y o  year old male who presents with chest pain for approximately one month  His symptom is left sided and only occurs with inspiration then immediately resolves  Today he had an episode associated with high BP  He has had no chest pain lasting more than a few seconds  He has no exertional angina, no dyspnea, and no palpitations  He has a history of CAD and prior inferior Wall MI  Inpatient consult to Cardiology  Consult performed by: Swathi Hanson  Consult ordered by: Gala Nixon          Review of Systems   Constitutional: Negative  HENT: Negative  Eyes: Negative  Respiratory: Negative  Cardiovascular: Positive for chest pain  Gastrointestinal: Negative  Endocrine: Negative  Genitourinary: Negative  Musculoskeletal: Negative  Skin: Negative  Allergic/Immunologic: Negative  Neurological: Negative  Psychiatric/Behavioral: Negative          Historical Information   Past Medical History:   Diagnosis Date    Coronary artery disease     GERD (gastroesophageal reflux disease)     Hyperlipidemia     Hypertension     Myocardial infarction 2007    Inferior wall     Past Surgical History:   Procedure Laterality Date    CAROTID STENT      CHOLECYSTECTOMY      CORONARY STENT PLACEMENT      [place] ESOPHAGOGASTRODUODENOSCOPY TRANSORAL DIAGNOSTIC N/A 1/25/2017    Procedure: EGD AND COLONOSCOPY;  Surgeon: Judi Jain MD; Location: [facility]; Service: Gastroenterology     History   Alcohol Use No     History   Drug Use No     History   Smoking Status    Former Smoker   Smokeless Tobacco    Not on file     Family History:   Family History   Problem Relation Age of Onset   Millie Menendez Breast cancer Mother    Millie Menendez Parkinsonism Father     Lymphoma Sister     Parkinsonism Brother     Prostate cancer Brother        Meds/Allergies   current meds:   Current Facility-Administered Medications   Medication Dose Route Frequency    acetaminophen (TYLENOL) tablet 650 mg  650 mg Oral Q4H PRN    amLODIPine-benazepril (LOTREL 5/40) combo dose   Oral Daily    carvedilol (COREG) tablet 12 5 mg  12 5 mg Oral BID With Meals    clopidogrel (PLAVIX) tablet 75 mg  75 mg Oral Daily    isosorbide mononitrate (ISMO,MONOKET) tablet 10 mg  10 mg Oral TID    labetalol (NORMODYNE) injection 10 mg  10 mg Intravenous Once    nitroglycerin (NITROSTAT) SL tablet 0 4 mg  0 4 mg Sublingual Q5 Min PRN    ondansetron (ZOFRAN) injection 4 mg  4 mg Intravenous Q6H PRN    pantoprazole (PROTONIX) EC tablet 20 mg  20 mg Oral Early Morning    pravastatin (PRAVACHOL) tablet 80 mg  80 mg Oral Daily With Dinner     No Known Allergies    Objective   Vitals: Blood pressure 128/96, pulse 78, temperature 98 4 °F (36 9 °C), temperature source Tympanic, resp  rate 22, height 5' 6" (1 676 m), weight 78 6 kg (173 lb 4 5 oz), SpO2 96 %    Orthostatic Blood Pressures    Flowsheet Row Most Recent Value   Blood Pressure  128/96 filed at 10/04/2017 1307   Patient Position - Orthostatic VS  Sitting filed at 10/04/2017 1307            Intake/Output Summary (Last 24 hours) at 10/04/17 1341  Last data filed at 10/04/17 0401   Gross per 24 hour   Intake            121 2 ml   Output              320 ml   Net           -198 8 ml       Invasive Devices     Peripheral Intravenous Line            Peripheral IV 10/03/17 Right Antecubital less than 1 day    Peripheral IV 10/04/17 Right Arm less than 1 day                Physical Exam   Constitutional: He is oriented to person, place, and time  No distress  HENT:   Mouth/Throat: No oropharyngeal exudate  Eyes: No scleral icterus  Neck: No JVD present  Cardiovascular: Normal rate and regular rhythm  No murmur heard  Pulmonary/Chest: Effort normal and breath sounds normal  No respiratory distress  He has no wheezes  He has no rales  Abdominal: Soft  Bowel sounds are normal  He exhibits no distension  There is no tenderness  There is no rebound  Musculoskeletal: He exhibits no edema  Neurological: He is alert and oriented to person, place, and time  Skin: Skin is warm and dry  He is not diaphoretic  Psychiatric: He has a normal mood and affect  His behavior is normal        Lab Results:   I have personally reviewed pertinent lab results      CBC with diff:   Results from last 7 days  Lab Units 10/04/17  0532   WBC Thousand/uL 8 41   RBC Million/uL 5 41   HEMOGLOBIN g/dL 13 4   HEMATOCRIT % 40 7   MCV fL 75*   MCH pg 24 8*   MCHC g/dL 32 9   RDW % 17 5*   MPV fL 12 3   PLATELETS Thousands/uL 182     CMP:   Results from last 7 days  Lab Units 10/04/17  0532   SODIUM mmol/L 141   POTASSIUM mmol/L 3 4*   CHLORIDE mmol/L 107   CO2 mmol/L 27   ANION GAP mmol/L 7   BUN mg/dL 17   CREATININE mg/dL 0 91   GLUCOSE RANDOM mg/dL 114   CALCIUM mg/dL 8 7   AST U/L 17   ALT U/L 23   ALK PHOS U/L 43*   TOTAL PROTEIN g/dL 6 6   ALBUMIN g/dL 3 0*   BILIRUBIN TOTAL mg/dL 0 40   EGFR ml/min/1 73sq m 81     Troponin:   0  Lab Value Date/Time   TROPONINI 0 02 10/04/2017 1102   TROPONINI <0 02 10/04/2017 0531   TROPONINI <0 02 10/04/2017 0238   TROPONINI 0 03 10/03/2017 2158   TROPONINI <0 02 01/13/2017 1432     BNP:   Results from last 7 days  Lab Units 10/04/17  0532   SODIUM mmol/L 141   POTASSIUM mmol/L 3 4*   CHLORIDE mmol/L 107   CO2 mmol/L 27   ANION GAP mmol/L 7   BUN mg/dL 17   CREATININE mg/dL 0 91   GLUCOSE RANDOM mg/dL 114   CALCIUM mg/dL 8 7   EGFR ml/min/1 73sq m 81     Coags:   Results from last 7 days  Lab Units 10/04/17  0535 10/03/17  2256   PTT seconds 76* 31   INR   --  0 98     TSH:     Magnesium:   Results from last 7 days  Lab Units 10/04/17  0532   MAGNESIUM mg/dL 2 0     Lipid Profile:   Results from last 7 days  Lab Units 10/04/17  0532   HDL mg/dL 48   CHOLESTEROL mg/dL 104   LDL CALC mg/dL 41   TRIGLYCERIDES mg/dL 75     Imaging: I have personally reviewed pertinent films in PACS  EKG: NSR LAD Low Voltage      Code Status: Level 1 - Full Code  Advance Directive and Living Will:      Power of :    POLST:      Counseling / Coordination of Care  Total floor / unit time spent today 45 minutes  Greater than 50% of total time was spent with the patient and / or family counseling and / or coordination of care  A description of the counseling / coordination of care

## 2017-10-04 NOTE — SOCIAL WORK
Attempted to meet with patient, he could not understand me and ask that I speak with his son  Spoke with Federico Jeffery at [phone], discussed role of Care Management  Was informed patient resides with another brother in a 2 story home with a daugher in law who does not work outside the home  Patient is independent of ADLs and uses no assistive device  Patient's wife in visiting relatives in [facility] and will be back soon  Patient plans to return home and no discharge needs are anticipated

## 2017-10-04 NOTE — ED NOTES
Patient resting in bed comfortably with family at bedside  Denies any current needs       Dennys Curiel RN  10/03/17 9514

## 2017-10-04 NOTE — ED PROVIDER NOTES
History  Chief Complaint   Patient presents with    Chest Pain     High BP all day, started with mid-sternal CP just pta, SOB earlier but now resolved   Hypertension       History provided by:  Patient   used: No        Patient is a 26-year-old male presenting to emergency department chest pain  Patient has been having substernal and left-sided chest pain with exertion for months  Today he started having chest pain while lying down at rest   Associated with shortness of breath  No nausea or vomiting  No diaphoresis  No radiation  Patient has a history of multiple stents  Last stent placed 6 years ago  Took a baby aspirin today  No cough  No leg swelling  No history of blood clots  MDM  Differential includes CAD, muscle pain, dissection, PE, pneumonia  Will do cardiac workup and dissection scan  Dissection scan is negative  I am concerned for unstable angina  Discussed with family risks and benefits of blood thinner  Will initiate heparin drip  Prior to Admission Medications   Prescriptions Last Dose Informant Patient Reported? Taking? Omeprazole 20 MG TBEC   Yes Yes   Sig: Take by mouth   amLODIPine-benazepril (LOTREL) 5-40 MG per capsule   Yes Yes   Sig: Take by mouth   aspirin 81 MG tablet   Yes No   Sig: Take 81 mg by mouth daily  carvedilol (COREG) 12 5 mg tablet   Yes No   Sig: Take 12 5 mg by mouth 2 (two) times a day with meals   clopidogrel (PLAVIX) 75 mg tablet   Yes No   Sig: Take 75 mg by mouth daily  isosorbide mononitrate (ISMO,MONOKET) 10 MG tablet   Yes No   Sig: Take 10 mg by mouth 3 (three) times a day  pravastatin (PRAVACHOL) 80 mg tablet   Yes No   Sig: Take 80 mg by mouth daily        Facility-Administered Medications: None       Past Medical History:   Diagnosis Date    Coronary artery disease     GERD (gastroesophageal reflux disease)     Hyperlipidemia     Hypertension     Myocardial infarction 2007       Past Surgical History:   Procedure Laterality Date    CAROTID STENT      CHOLECYSTECTOMY      CORONARY STENT PLACEMENT      [place] ESOPHAGOGASTRODUODENOSCOPY TRANSORAL DIAGNOSTIC N/A 1/25/2017    Procedure: EGD AND COLONOSCOPY;  Surgeon: Matthew Telles MD;  Location: [facility]; Service: Gastroenterology       History reviewed  No pertinent family history  I have reviewed and agree with the history as documented  Social History   Substance Use Topics    Smoking status: Former Smoker    Smokeless tobacco: Not on file    Alcohol use No        Review of Systems   Constitutional: Negative for chills, diaphoresis and fever  HENT: Negative for congestion and sore throat  Respiratory: Positive for shortness of breath  Negative for cough, wheezing and stridor  Cardiovascular: Positive for chest pain  Negative for palpitations and leg swelling  Gastrointestinal: Negative for abdominal pain, blood in stool, diarrhea, nausea and vomiting  Genitourinary: Negative for dysuria, frequency and urgency  Musculoskeletal: Negative for neck pain and neck stiffness  Skin: Negative for pallor and rash  Neurological: Negative for dizziness, syncope, weakness, light-headedness and headaches  All other systems reviewed and are negative  Physical Exam  ED Triage Vitals   Temperature Pulse Respirations Blood Pressure SpO2   10/04/17 0015 10/03/17 2142 10/03/17 2142 10/03/17 2142 10/03/17 2142   (!) 96 7 °F (35 9 °C) 72 18 (!) 193/93 97 %      Temp Source Heart Rate Source Patient Position - Orthostatic VS BP Location FiO2 (%)   10/04/17 0015 10/03/17 2142 10/03/17 2142 10/03/17 2142 --   Tympanic Monitor Lying Right arm       Pain Score       10/03/17 2330       4           Physical Exam   Constitutional: He is oriented to person, place, and time  He appears well-developed and well-nourished  HENT:   Head: Normocephalic and atraumatic  Eyes: EOM are normal  Pupils are equal, round, and reactive to light     Neck: Normal range of motion  Neck supple  Cardiovascular: Normal rate, regular rhythm, normal heart sounds and intact distal pulses  Pulmonary/Chest: Effort normal and breath sounds normal  No respiratory distress  Abdominal: Soft  Bowel sounds are normal  There is no tenderness  Musculoskeletal: Normal range of motion  He exhibits no edema or tenderness  Neurological: He is alert and oriented to person, place, and time  Skin: Skin is warm and dry  Capillary refill takes less than 2 seconds  No erythema  No pallor  Vitals reviewed        ED Medications  Medications   labetalol (NORMODYNE) injection 10 mg (0 mg Intravenous Hold 10/3/17 2245)   heparin (porcine) 25,000 units in 250 mL infusion (premix) (12 Units/kg/hr × 75 kg (Order-Specific) Intravenous New Bag 10/4/17 0033)   heparin (porcine) injection 4,000 Units (not administered)   heparin (porcine) injection 2,000 Units (not administered)   ondansetron (ZOFRAN) injection 4 mg (not administered)   aspirin chewable tablet 325 mg (not administered)   acetaminophen (TYLENOL) tablet 650 mg (not administered)   amLODIPine-benazepril (LOTREL 5/40) combo dose (not administered)   carvedilol (COREG) tablet 12 5 mg (not administered)   clopidogrel (PLAVIX) tablet 75 mg (not administered)   isosorbide mononitrate (ISMO,MONOKET) tablet 10 mg (not administered)   pantoprazole (PROTONIX) EC tablet 20 mg (not administered)   pravastatin (PRAVACHOL) tablet 80 mg (not administered)   aspirin chewable tablet 243 mg (243 mg Oral Given 10/3/17 2252)   iohexol (OMNIPAQUE) 350 MG/ML injection (MULTI-DOSE) 100 mL (100 mL Intravenous Given 10/3/17 2305)   heparin (porcine) injection 4,000 Units (4,000 Units Intravenous Given 10/4/17 0024)       Diagnostic Studies  Labs Reviewed   CBC AND DIFFERENTIAL - Abnormal        Result Value Ref Range Status    MCV 77 (*) 82 - 98 fL Final    MCH 25 3 (*) 26 8 - 34 3 pg Final    RDW 17 7 (*) 11 6 - 15 1 % Final    WBC 8 94  4 31 - 10 16 Thousand/uL Final    RBC 5 61  3 88 - 5 62 Million/uL Final    Hemoglobin 14 2  12 0 - 17 0 g/dL Final    Hematocrit 42 9  36 5 - 49 3 % Final    MCHC 33 1  31 4 - 37 4 g/dL Final    MPV 12 6  8 9 - 12 7 fL Final    Platelets 226  156 - 390 Thousands/uL Final    Neutrophils Relative 56  43 - 75 % Final    Lymphocytes Relative 27  14 - 44 % Final    Monocytes Relative 11  4 - 12 % Final    Eosinophils Relative 5  0 - 6 % Final    Basophils Relative 1  0 - 1 % Final    Neutrophils Absolute 5 00  1 85 - 7 62 Thousands/µL Final    Lymphocytes Absolute 2 43  0 60 - 4 47 Thousands/µL Final    Monocytes Absolute 0 98  0 17 - 1 22 Thousand/µL Final    Eosinophils Absolute 0 48  0 00 - 0 61 Thousand/µL Final    Basophils Absolute 0 05  0 00 - 0 10 Thousands/µL Final   TROPONIN I - Normal    Troponin I 0 03  <=0 04 ng/mL Final    Narrative:     [employer] Chemistry analyzer 99% cutoff is > 0 04 ng/mL in network labs    o cTnI 99% cutoff is useful only when applied to patients in the clinical setting of myocardial ischemia  o cTnI 99% cutoff should be interpreted in the context of clinical history, ECG findings and possibly cardiac imaging to establish correct diagnosis  o cTnI 99% cutoff may be suggestive but clearly not indicative of a coronary event without the clinical setting of myocardial ischemia  HEPATIC FUNCTION PANEL - Normal    Total Bilirubin 0 40  0 20 - 1 00 mg/dL Final    Bilirubin, Direct 0 13  0 00 - 0 20 mg/dL Final    Alkaline Phosphatase 53  46 - 116 U/L Final    AST 16  5 - 45 U/L Final    Comment:   Specimen collection should occur prior to Sulfasalazine administration due to the potential for falsely depressed results  ALT 28  12 - 78 U/L Final    Comment:   Specimen collection should occur prior to Sulfasalazine administration due to the potential for falsely depressed results       Total Protein 7 8  6 4 - 8 2 g/dL Final    Albumin 3 6  3 5 - 5 0 g/dL Final   LIPASE - Normal    Lipase 80  73 - 393 u/L Final   PROTIME-INR - Normal    Protime 12 8  12 1 - 14 4 seconds Final    INR 0 98  0 86 - 1 16 Final   APTT - Normal    PTT 31  23 - 35 seconds Final    Narrative: Therapeutic Heparin Range = 60-90 seconds   COMPREHENSIVE METABOLIC PANEL    Sodium 729  136 - 145 mmol/L Final    Potassium 3 5  3 5 - 5 3 mmol/L Final    Chloride 106  100 - 108 mmol/L Final    CO2 29  21 - 32 mmol/L Final    Anion Gap 6  4 - 13 mmol/L Final    BUN 19  5 - 25 mg/dL Final    Creatinine 1 13  0 60 - 1 30 mg/dL Final    Comment: Standardized to IDMS reference method    Glucose 99  65 - 140 mg/dL Final    Comment:   If the patient is fasting, the ADA then defines impaired fasting glucose as > 100 mg/dL and diabetes as > or equal to 123 mg/dL  Specimen collection should occur prior to Sulfasalazine administration due to the potential for falsely depressed results  Specimen collection should occur prior to Sulfapyridine administration due to the potential for falsely elevated results  Calcium 9 0  8 3 - 10 1 mg/dL Final    AST 16  5 - 45 U/L Final    Comment:   Specimen collection should occur prior to Sulfasalazine administration due to the potential for falsely depressed results  ALT 29  12 - 78 U/L Final    Comment:   Specimen collection should occur prior to Sulfasalazine administration due to the potential for falsely depressed results  Alkaline Phosphatase 49  46 - 116 U/L Final    Total Protein 7 7  6 4 - 8 2 g/dL Final    Albumin 3 6  3 5 - 5 0 g/dL Final    Total Bilirubin 0 40  0 20 - 1 00 mg/dL Final    eGFR 62  ml/min/1 73sq m Final    Narrative:     National Kidney Disease Education Program recommendations are as follows:  GFR calculation is accurate only with a steady state creatinine  Chronic Kidney disease less than 60 ml/min/1 73 sq  meters  Kidney failure less than 15 ml/min/1 73 sq  meters     APTT   CBC AND PLATELET   PROTIME-INR   TROPONIN I       X-ray chest 2 views    (Results Pending)   CTA dissection protocol chest and abdomen    (Results Pending)       Procedures  Procedures      Phone Contacts  ED Phone Contact    ED Course  ED Course as of Oct 04 0147   Tue Oct 03, 2017   2310  ECG shows normal sinus  Left axis deviation  No significant ST or T-wave changes  Normal rate  MDM  CritCare Time    Disposition  Final diagnoses:   Unstable angina [facility])     ED Disposition     ED Disposition Condition Comment    Admit  Case was discussed with Clara Valerio and the patient's admission status was agreed to be Admission Status: inpatient status to the service of Dr Sandoval [facility]   Follow-up Information    None       Patient's Medications   Discharge Prescriptions    No medications on file     No discharge procedures on file      ED Provider  Electronically Signed by       Conner Valdez MD  10/04/17 7270

## 2017-10-05 VITALS
OXYGEN SATURATION: 95 % | WEIGHT: 172.62 LBS | HEART RATE: 57 BPM | BODY MASS INDEX: 27.74 KG/M2 | SYSTOLIC BLOOD PRESSURE: 143 MMHG | HEIGHT: 66 IN | TEMPERATURE: 97.9 F | DIASTOLIC BLOOD PRESSURE: 75 MMHG | RESPIRATION RATE: 18 BRPM

## 2017-10-05 PROBLEM — R07.9 CHEST PAIN IN ADULT: Status: ACTIVE | Noted: 2017-10-05

## 2017-10-05 PROBLEM — I20.0 UNSTABLE ANGINA (HCC): Status: RESOLVED | Noted: 2017-10-04 | Resolved: 2017-10-05

## 2017-10-05 LAB
ANION GAP SERPL CALCULATED.3IONS-SCNC: 6 MMOL/L (ref 4–13)
BASOPHILS # BLD AUTO: 0.04 THOUSANDS/ΜL (ref 0–0.1)
BASOPHILS NFR BLD AUTO: 0 % (ref 0–1)
BUN SERPL-MCNC: 16 MG/DL (ref 5–25)
CALCIUM SERPL-MCNC: 8.9 MG/DL (ref 8.3–10.1)
CHLORIDE SERPL-SCNC: 106 MMOL/L (ref 100–108)
CO2 SERPL-SCNC: 29 MMOL/L (ref 21–32)
CREAT SERPL-MCNC: 1 MG/DL (ref 0.6–1.3)
EOSINOPHIL # BLD AUTO: 0.35 THOUSAND/ΜL (ref 0–0.61)
EOSINOPHIL NFR BLD AUTO: 4 % (ref 0–6)
ERYTHROCYTE [DISTWIDTH] IN BLOOD BY AUTOMATED COUNT: 17.8 % (ref 11.6–15.1)
GFR SERPL CREATININE-BSD FRML MDRD: 72 ML/MIN/1.73SQ M
GLUCOSE SERPL-MCNC: 104 MG/DL (ref 65–140)
HCT VFR BLD AUTO: 42.5 % (ref 36.5–49.3)
HGB BLD-MCNC: 14.2 G/DL (ref 12–17)
INR PPP: 1.03 (ref 0.86–1.16)
LYMPHOCYTES # BLD AUTO: 2 THOUSANDS/ΜL (ref 0.6–4.47)
LYMPHOCYTES NFR BLD AUTO: 22 % (ref 14–44)
MCH RBC QN AUTO: 25.8 PG (ref 26.8–34.3)
MCHC RBC AUTO-ENTMCNC: 33.4 G/DL (ref 31.4–37.4)
MCV RBC AUTO: 77 FL (ref 82–98)
MONOCYTES # BLD AUTO: 0.95 THOUSAND/ΜL (ref 0.17–1.22)
MONOCYTES NFR BLD AUTO: 11 % (ref 4–12)
NEUTROPHILS # BLD AUTO: 5.61 THOUSANDS/ΜL (ref 1.85–7.62)
NEUTS SEG NFR BLD AUTO: 63 % (ref 43–75)
PLATELET # BLD AUTO: 180 THOUSANDS/UL (ref 149–390)
PMV BLD AUTO: 12.4 FL (ref 8.9–12.7)
POTASSIUM SERPL-SCNC: 3.9 MMOL/L (ref 3.5–5.3)
PROTHROMBIN TIME: 13.3 SECONDS (ref 12.1–14.4)
RBC # BLD AUTO: 5.51 MILLION/UL (ref 3.88–5.62)
SODIUM SERPL-SCNC: 141 MMOL/L (ref 136–145)
WBC # BLD AUTO: 8.95 THOUSAND/UL (ref 4.31–10.16)

## 2017-10-05 PROCEDURE — 80048 BASIC METABOLIC PNL TOTAL CA: CPT | Performed by: INTERNAL MEDICINE

## 2017-10-05 PROCEDURE — 85610 PROTHROMBIN TIME: CPT | Performed by: NURSE PRACTITIONER

## 2017-10-05 PROCEDURE — 85025 COMPLETE CBC W/AUTO DIFF WBC: CPT | Performed by: INTERNAL MEDICINE

## 2017-10-05 RX ORDER — NICOTINE POLACRILEX 4 MG/1
20 GUM, CHEWING ORAL 2 TIMES DAILY
Refills: 0
Start: 2017-10-05 | End: 2018-06-25 | Stop reason: ALTCHOICE

## 2017-10-05 RX ADMIN — CLOPIDOGREL 75 MG: 75 TABLET, FILM COATED ORAL at 08:11

## 2017-10-05 RX ADMIN — CARVEDILOL 12.5 MG: 12.5 TABLET, FILM COATED ORAL at 07:54

## 2017-10-05 RX ADMIN — BENAZEPRIL HYDROCHLORIDE: 10 TABLET, FILM COATED ORAL at 08:11

## 2017-10-05 RX ADMIN — PANTOPRAZOLE SODIUM 20 MG: 20 TABLET, DELAYED RELEASE ORAL at 05:00

## 2017-10-05 RX ADMIN — ISOSORBIDE DINITRATE 10 MG: 5 TABLET ORAL at 07:54

## 2017-10-05 NOTE — DISCHARGE SUMMARY
Discharge Summary - [facility] Internal Medicine    Patient Information: Portillo Fernandez 67 y o  male MRN: [id number]  Unit/Bed#: -01 Encounter: [id number]    Discharging Physician / Practitioner: Bert Lovett MD  PCP: Daniela Bauer MD  Admission Date: 10/3/2017  Discharge Date: 10/05/17    Reason for Admission:  Chest pain    Discharge Diagnoses:     Principal Problem:    Chest pain in adult  Active Problems:    Hypertension    GERD (gastroesophageal reflux disease)    Hyperlipidemia  Resolved Problems:    Unstable angina [facility])      Consultations During Hospital Stay:  · Cardiology    Procedures Performed:     Echocardiogram-normal cardiac function no significant valvular up    Significant Findings / Test Results:     Ultrasound of the kidney-The indeterminant lesion described on prior CT is shown to be a 1 4 x 1 4 x 1 5 cm left kidney mid to upper pole posterior cyst, that is only minimally complex with one thin septation  No further follow-up or workup is needed  CTA dissection protocol-No aortic dissection  Indeterminate left renal lesion for which follow-up ultrasound is recommended        Incidental Findings:   · Renal cysts    Test Results Pending at Discharge (will require follow up): Outpatient Tests Requested:    Complications: none    Hospital Course:     Portillo Fernandez is a 67 y o  male patient who originally presented to the hospital on 10/3/2017 due to chest pain  Patient had a CT dissection protocol which came back unremarkable  Patient was admitted with a diagnosis of chest pain rule out acute coronary syndrome and he had cardiac enzymes done  Initially patient was started on heparin drip once his troponin and came back negative the heparin tricks was discontinued  Cardiology evaluated the patient and recommended an echocardiogram which was done and which was rather unremarkable    Patient continued to have some mild chest pain with deep inspiration and also he was tender on palpation in the epigastric region  Likely secondary to GERD  I recommended that patient should take the omeprazole fasting the morning and also make it twice a day and follow up with the primary care physician  His LFTs were within normal limits at the time of admission and the CT scan did not show any evidence of gallbladder involvement  This patient was discharged in a stable condition on 10/5/17  For details refer to the chart    Condition at Discharge: good     Discharge Day Visit / Exam:     Subjective:  Patient seen and examined  Patient complaining of epigastric pain with palpation and with known history of GERD  Vitals: Blood Pressure: 143/75 (10/05/17 0618)  Pulse: 57 (10/05/17 0618)  Temperature: 97 9 °F (36 6 °C) (10/05/17 0618)  Temp Source: Oral (10/05/17 0217)  Respirations: 18 (10/05/17 0618)  Height: 5' 6" (167 6 cm) (10/04/17 0344)  Weight - Scale: 78 3 kg (172 lb 9 9 oz) (10/05/17 0618)  SpO2: 95 % (10/05/17 0618)  Exam:   Physical Exam   Constitutional: He is oriented to person, place, and time  He appears well-developed and well-nourished  HENT:   Head: Normocephalic and atraumatic  Eyes: EOM are normal  Pupils are equal, round, and reactive to light  Neck: Normal range of motion  Neck supple  Cardiovascular: Normal rate and regular rhythm  Pulmonary/Chest: Effort normal and breath sounds normal    Abdominal: Soft  Bowel sounds are normal  He exhibits no distension  There is tenderness  Mild tenderness in the epigastric region   Musculoskeletal: Normal range of motion  He exhibits no edema  Neurological: He is alert and oriented to person, place, and time  No cranial nerve deficit  Skin: Skin is warm and dry  No erythema  Discussion with Family:     Discharge instructions/Information to patient and family:   See after visit summary for information provided to patient and family        Provisions for Follow-Up Care:  See after visit summary for information related to follow-up care and any pertinent home health orders  Disposition:     Home    For Discharges to [facility] SNF:   · Not Applicable to this Patient - Not Applicable to this Patient    Planned Readmission: none     Discharge Statement:  I spent 45  minutes discharging the patient  This time was spent on the day of discharge  I had direct contact with the patient on the day of discharge  Greater than 50% of the total time was spent examining patient, answering all patient questions, arranging and discussing plan of care with patient as well as directly providing post-discharge instructions  Additional time then spent on discharge activities  Discharge Medications:  See after visit summary for reconciled discharge medications provided to patient and family        ** Please Note: This note has been constructed using a voice recognition system **

## 2017-10-18 ENCOUNTER — LAB CONVERSION - ENCOUNTER (OUTPATIENT)
Dept: OTHER | Facility: OTHER | Age: 77
End: 2017-10-18

## 2017-10-18 LAB
BUN SERPL-MCNC: 16 MG/DL (ref 7–25)
BUN/CREA RATIO (HISTORICAL): ABNORMAL (CALC) (ref 6–22)
CALCIUM SERPL-MCNC: 9.4 MG/DL (ref 8.6–10.3)
CHLORIDE SERPL-SCNC: 105 MMOL/L (ref 98–110)
CHOLEST SERPL-MCNC: 131 MG/DL
CHOLEST/HDLC SERPL: 3 (CALC)
CO2 SERPL-SCNC: 27 MMOL/L (ref 20–31)
CREAT SERPL-MCNC: 1.05 MG/DL (ref 0.7–1.18)
EGFR AFRICAN AMERICAN (HISTORICAL): 79 ML/MIN/1.73M2
EGFR-AMERICAN CALC (HISTORICAL): 68 ML/MIN/1.73M2
GLUCOSE (HISTORICAL): 104 MG/DL (ref 65–99)
HDLC SERPL-MCNC: 43 MG/DL
LDL CHOLESTEROL (HISTORICAL): 70 MG/DL (CALC)
NON-HDL-CHOL (CHOL-HDL) (HISTORICAL): 88 MG/DL (CALC)
POTASSIUM SERPL-SCNC: 4.2 MMOL/L (ref 3.5–5.3)
SODIUM SERPL-SCNC: 139 MMOL/L (ref 135–146)
TRIGL SERPL-MCNC: 92 MG/DL

## 2017-10-31 ENCOUNTER — GENERIC CONVERSION - ENCOUNTER (OUTPATIENT)
Dept: OTHER | Facility: OTHER | Age: 77
End: 2017-10-31

## 2018-01-14 VITALS
WEIGHT: 192 LBS | SYSTOLIC BLOOD PRESSURE: 132 MMHG | HEART RATE: 75 BPM | DIASTOLIC BLOOD PRESSURE: 82 MMHG | HEIGHT: 65 IN | BODY MASS INDEX: 31.99 KG/M2

## 2018-01-17 ENCOUNTER — HOSPITAL ENCOUNTER (OUTPATIENT)
Dept: RADIOLOGY | Facility: HOSPITAL | Age: 78
Discharge: HOME | End: 2018-01-17
Payer: MEDICARE

## 2018-01-17 ENCOUNTER — GENERIC CONVERSION - ENCOUNTER (OUTPATIENT)
Dept: OTHER | Facility: OTHER | Age: 78
End: 2018-01-17

## 2018-01-17 ENCOUNTER — TRANSCRIBE ORDERS (OUTPATIENT)
Dept: ADMINISTRATIVE | Facility: HOSPITAL | Age: 78
End: 2018-01-17

## 2018-01-17 DIAGNOSIS — M79.671 RIGHT FOOT PAIN: Primary | ICD-10-CM

## 2018-01-17 DIAGNOSIS — M79.671 RIGHT FOOT PAIN: ICD-10-CM

## 2018-01-17 PROCEDURE — 73630 X-RAY EXAM OF FOOT: CPT

## 2018-01-22 VITALS
SYSTOLIC BLOOD PRESSURE: 128 MMHG | HEIGHT: 65 IN | BODY MASS INDEX: 29.66 KG/M2 | DIASTOLIC BLOOD PRESSURE: 78 MMHG | HEART RATE: 56 BPM | WEIGHT: 178 LBS

## 2018-02-06 ENCOUNTER — OFFICE VISIT (OUTPATIENT)
Dept: OBGYN CLINIC | Facility: CLINIC | Age: 78
End: 2018-02-06
Payer: MEDICARE

## 2018-02-06 ENCOUNTER — APPOINTMENT (OUTPATIENT)
Dept: RADIOLOGY | Facility: CLINIC | Age: 78
End: 2018-02-06
Payer: MEDICARE

## 2018-02-06 VITALS
DIASTOLIC BLOOD PRESSURE: 66 MMHG | WEIGHT: 183 LBS | BODY MASS INDEX: 29.41 KG/M2 | HEIGHT: 66 IN | SYSTOLIC BLOOD PRESSURE: 120 MMHG

## 2018-02-06 DIAGNOSIS — M25.571 ACUTE RIGHT ANKLE PAIN: Primary | ICD-10-CM

## 2018-02-06 PROCEDURE — 73610 X-RAY EXAM OF ANKLE: CPT

## 2018-02-06 PROCEDURE — 99203 OFFICE O/P NEW LOW 30 MIN: CPT | Performed by: ORTHOPAEDIC SURGERY

## 2018-02-06 RX ORDER — LISINOPRIL 40 MG/1
TABLET ORAL
COMMUNITY
Start: 2016-07-06 | End: 2018-06-25

## 2018-02-06 RX ORDER — METOPROLOL TARTRATE 50 MG/1
50 TABLET, FILM COATED ORAL
COMMUNITY
End: 2018-06-25

## 2018-02-06 RX ORDER — POLYETHYLENE GLYCOL 3350 17 G/17G
POWDER, FOR SOLUTION ORAL
COMMUNITY
Start: 2017-10-12 | End: 2018-06-25 | Stop reason: SDUPTHER

## 2018-02-06 RX ORDER — PRAVASTATIN SODIUM 80 MG/1
80 TABLET ORAL
COMMUNITY
End: 2018-06-25 | Stop reason: SDUPTHER

## 2018-02-06 RX ORDER — CARVEDILOL 12.5 MG/1
12.5 TABLET ORAL
COMMUNITY
End: 2018-06-25 | Stop reason: SDUPTHER

## 2018-02-06 RX ORDER — CLOPIDOGREL BISULFATE 75 MG/1
75 TABLET ORAL
COMMUNITY
End: 2018-04-13 | Stop reason: SDUPTHER

## 2018-02-06 RX ORDER — ISOSORBIDE MONONITRATE 10 MG/1
10 TABLET ORAL
COMMUNITY
End: 2018-03-12 | Stop reason: SDUPTHER

## 2018-02-06 RX ORDER — AMLODIPINE BESYLATE 10 MG/1
TABLET ORAL
COMMUNITY
Start: 2016-07-06 | End: 2018-06-25 | Stop reason: ALTCHOICE

## 2018-02-06 RX ORDER — OMEPRAZOLE 20 MG/1
CAPSULE, DELAYED RELEASE ORAL
Refills: 0 | COMMUNITY
Start: 2018-01-11

## 2018-02-06 RX ORDER — FUROSEMIDE 20 MG/1
TABLET ORAL
Refills: 0 | COMMUNITY
Start: 2018-01-11 | End: 2018-04-16 | Stop reason: SDUPTHER

## 2018-02-06 NOTE — ASSESSMENT & PLAN NOTE
Findings and treatment options were discussed with the patient and his son  It is unclear as to what is causing his pain at this point since there was no injury  Recommend physical therapy for his right ankle and he was also prescribed an ASO ankle brace to wear as needed with activities  Continue ice and Tylenol as needed  Follow up in 6 weeks for re-evaluation

## 2018-02-06 NOTE — PROGRESS NOTES
Assessment:     1  Acute right ankle pain        Plan:  The patient was seen and examined by Dr Bella Olivarez and myself  Problem List Items Addressed This Visit        Other    Acute right ankle pain - Primary     Findings and treatment options were discussed with the patient and his son  It is unclear as to what is causing his pain at this point since there was no injury  Recommend physical therapy for his right ankle and he was also prescribed an ASO ankle brace to wear as needed with activities  Continue ice and Tylenol as needed  Follow up in 6 weeks for re-evaluation  Relevant Orders    XR ankle 3+ vw right    Ankle Cude ankle/Ankle Brace    Ambulatory referral to Physical Therapy         Subjective:     Patient ID: Jose Castillo is a 65 y o  male  Chief Complaint:  Right ankle pain  HPI   This is a 19-year-old male accompanied by his son complaining of right ankle pain for the past month  The patient does not speak [place] and his son is translating for him  He denies any injury or change in activities  The pain was gradual in onset  The pain is mostly aching with occasional sharp pain over the anterior aspect of his ankle  The pain is worse with walking  He denies any increased swelling  He has no history of gout or Lyme's  No treatment as of yet  He denies any history of similar symptoms in the past   Patient intake form was reviewed today       Allergy:  Allergies   Allergen Reactions    Hydrochlorothiazide      Annotation - [id number]: PANCREATIC INFLAMMATION     Medications:  all current active meds have been reviewed     Past Medical History:  Past Medical History:   Diagnosis Date    Coronary artery disease     Fractures     GERD (gastroesophageal reflux disease)     Heart disease     Hyperlipidemia     Hypertension     Myocardial infarction 2007    Inferior wall     Past Surgical History:  Past Surgical History:   Procedure Laterality Date    CAROTID STENT      CHOLECYSTECTOMY      CORONARY STENT PLACEMENT      [place] ESOPHAGOGASTRODUODENOSCOPY TRANSORAL DIAGNOSTIC N/A 1/25/2017    Procedure: EGD AND COLONOSCOPY;  Surgeon: Jillian Glez MD;  Location: [facility]; Service: Gastroenterology     Family History:  Family History   Problem Relation Age of Onset   [employer] Breast cancer Mother    [employer] Parkinsonism Father     Lymphoma Sister     Parkinsonism Brother     Prostate cancer Brother      Social History:  History   Alcohol Use    Yes     History   Drug Use No     History   Smoking Status    Former Smoker   Smokeless Tobacco    Never Used     Review of Systems   Constitutional: Negative  HENT: Negative  Eyes: Negative  Respiratory: Negative  Cardiovascular: Negative  Gastrointestinal: Negative  Endocrine: Negative  Genitourinary: Negative  Musculoskeletal: Positive for arthralgias  Skin: Negative  Neurological: Negative  Hematological: Negative  Psychiatric/Behavioral: Negative  Objective:  BP Readings from Last 1 Encounters:   02/06/18 120/66      Wt Readings from Last 1 Encounters:   02/06/18 83 kg (183 lb)      BMI:   Estimated body mass index is 29 54 kg/m² as calculated from the following:    Height as of this encounter: 5' 6" (1 676 m)  Weight as of this encounter: 83 kg (183 lb)  BSA:   Estimated body surface area is 1 93 meters squared as calculated from the following:    Height as of this encounter: 5' 6" (1 676 m)  Weight as of this encounter: 83 kg (183 lb)  Physical Exam   Constitutional: He is oriented to person, place, and time  He appears well-developed  HENT:   Head: Normocephalic and atraumatic  Eyes: EOM are normal  Pupils are equal, round, and reactive to light  Neck: Neck supple  Musculoskeletal: He exhibits tenderness  Neurological: He is alert and oriented to person, place, and time  Skin: Skin is warm  Psychiatric: He has a normal mood and affect     Nursing note and vitals reviewed  Right Ankle Exam     Tenderness   The patient is experiencing tenderness in the ATF (anterior tibiotalar joint)  Range of Motion   Dorsiflexion:  20 abnormal   Plantar flexion:  30 abnormal   Inversion: abnormal   Eversion: abnormal     Muscle Strength   The patient has normal right ankle strength  Tests   Anterior drawer: negative  Other   Erythema: absent  Scars: absent  Sensation: normal  Pulse: present     Comments:  1+ pitting edema that is present bilaterally  Left Ankle Exam   Left ankle exam is normal               X-rays of the right ankle reveal no abnormalities

## 2018-03-12 DIAGNOSIS — I10 HYPERTENSION, UNSPECIFIED TYPE: Primary | ICD-10-CM

## 2018-03-12 RX ORDER — ISOSORBIDE MONONITRATE 10 MG/1
10 TABLET ORAL 3 TIMES DAILY
Qty: 90 TABLET | Refills: 3 | Status: SHIPPED | OUTPATIENT
Start: 2018-03-12 | End: 2018-07-13 | Stop reason: SDUPTHER

## 2018-03-20 ENCOUNTER — OFFICE VISIT (OUTPATIENT)
Dept: OBGYN CLINIC | Facility: CLINIC | Age: 78
End: 2018-03-20
Payer: MEDICARE

## 2018-03-20 VITALS
WEIGHT: 183 LBS | SYSTOLIC BLOOD PRESSURE: 171 MMHG | BODY MASS INDEX: 29.41 KG/M2 | HEIGHT: 66 IN | HEART RATE: 66 BPM | DIASTOLIC BLOOD PRESSURE: 84 MMHG

## 2018-03-20 DIAGNOSIS — M25.571 ACUTE RIGHT ANKLE PAIN: Primary | ICD-10-CM

## 2018-03-20 PROCEDURE — 99213 OFFICE O/P EST LOW 20 MIN: CPT | Performed by: ORTHOPAEDIC SURGERY

## 2018-03-20 RX ORDER — POLYETHYLENE GLYCOL 3350 17 G/17G
POWDER, FOR SOLUTION ORAL
Refills: 0 | COMMUNITY
Start: 2018-03-12

## 2018-03-20 NOTE — PROGRESS NOTES
Assessment:     1  Acute right ankle pain        Plan:       Problem List Items Addressed This Visit        Other    Acute right ankle pain - Primary     76-year-old male with continued right ankle pain  He has some stiffness actively, and diffuse tenderness  He will continue wearing the ASO as he feels comfortable  I have strongly recommended getting into physical therapy even if it is just for a few visits to understand what exercises are best for him  I will see him back as needed  Subjective:     Patient ID: Lan Mayberry is a 65 y o  male  Chief Complaint:  Right ankle pain  HPI   This is a 48-year-old male accompanied by his son complaining of right ankle pain since January  The patient does not speak [place] and his son is translating for him  He denies any injury or change in activities  The pain was gradual in onset  The pain is mostly aching with occasional sharp pain over the anterior aspect of his ankle  The pain is worse with walking  He denies any increased swelling  He has no history of gout or Lyme's  He has been wearing the ASO  Overall his pain is some better, but he does note stiffness and some pain with walking  He has not done physical therapy      Allergy:  Allergies   Allergen Reactions    Hydrochlorothiazide      Annotation - [id number]: PANCREATIC INFLAMMATION     Medications:  all current active meds have been reviewed     Past Medical History:  Past Medical History:   Diagnosis Date    Coronary artery disease     Fractures     GERD (gastroesophageal reflux disease)     Heart disease     Hyperlipidemia     Hypertension     Myocardial infarction 2007    Inferior wall     Past Surgical History:  Past Surgical History:   Procedure Laterality Date    CAROTID STENT      CHOLECYSTECTOMY      CORONARY STENT PLACEMENT      [place] ESOPHAGOGASTRODUODENOSCOPY TRANSORAL DIAGNOSTIC N/A 1/25/2017    Procedure: EGD AND COLONOSCOPY;  Surgeon: Madi Barber MD; Location: [facility]; Service: Gastroenterology     Family History:  Family History   Problem Relation Age of Onset   [employer] Breast cancer Mother    [employer] Parkinsonism Father     Lymphoma Sister     Parkinsonism Brother     Prostate cancer Brother      Social History:  History   Alcohol Use    Yes     History   Drug Use No     History   Smoking Status    Former Smoker   Smokeless Tobacco    Never Used     Review of Systems   Constitutional: Negative  HENT: Negative  Eyes: Negative  Respiratory: Negative  Cardiovascular: Negative  Gastrointestinal: Negative  Endocrine: Negative  Genitourinary: Negative  Musculoskeletal: Positive for arthralgias  Skin: Negative  Neurological: Negative  Hematological: Negative  Psychiatric/Behavioral: Negative  Objective:  BP Readings from Last 1 Encounters:   03/20/18 (!) 171/84      Wt Readings from Last 1 Encounters:   03/20/18 83 kg (183 lb)      BMI:   Estimated body mass index is 29 54 kg/m² as calculated from the following:    Height as of this encounter: 5' 6" (1 676 m)  Weight as of this encounter: 83 kg (183 lb)  BSA:   Estimated body surface area is 1 93 meters squared as calculated from the following:    Height as of this encounter: 5' 6" (1 676 m)  Weight as of this encounter: 83 kg (183 lb)  Physical Exam   Constitutional: He is oriented to person, place, and time  He appears well-developed  HENT:   Head: Normocephalic and atraumatic  Eyes: EOM are normal  Pupils are equal, round, and reactive to light  Neck: Neck supple  Musculoskeletal: He exhibits tenderness  Neurological: He is alert and oriented to person, place, and time  Skin: Skin is warm  Psychiatric: He has a normal mood and affect  Nursing note and vitals reviewed  Right Ankle Exam     Tenderness   The patient is experiencing tenderness in the ATF (anterior tibiotalar joint)          Range of Motion   Dorsiflexion:  20 abnormal   Plantar flexion:  30 abnormal   Inversion: abnormal   Eversion: abnormal     Muscle Strength   The patient has normal right ankle strength  Tests   Anterior drawer: negative  Other   Erythema: absent  Scars: absent  Sensation: normal  Pulse: present     Comments:  Full passive ankle range of motion including pronation and supination  Significantly decreased active range of motion  Diffuse tenderness throughout, mostly anteriorly  Left Ankle Exam   Left ankle exam is normal               X-rays of the right ankle reveal no abnormalities

## 2018-03-20 NOTE — ASSESSMENT & PLAN NOTE
67-year-old male with continued right ankle pain  He has some stiffness actively, and diffuse tenderness  He will continue wearing the ASO as he feels comfortable  I have strongly recommended getting into physical therapy even if it is just for a few visits to understand what exercises are best for him  I will see him back as needed

## 2018-04-09 DIAGNOSIS — I25.10 ATHEROSCLEROSIS OF NATIVE CORONARY ARTERY OF NATIVE HEART WITHOUT ANGINA PECTORIS: Primary | ICD-10-CM

## 2018-04-13 DIAGNOSIS — I25.10 CORONARY ARTERY DISEASE INVOLVING NATIVE CORONARY ARTERY OF NATIVE HEART WITHOUT ANGINA PECTORIS: Primary | ICD-10-CM

## 2018-04-16 DIAGNOSIS — I10 BENIGN ESSENTIAL HYPERTENSION: Primary | ICD-10-CM

## 2018-04-16 RX ORDER — AMLODIPINE BESYLATE AND BENAZEPRIL HYDROCHLORIDE 5; 40 MG/1; MG/1
CAPSULE ORAL
Qty: 30 CAPSULE | Refills: 7 | Status: SHIPPED | OUTPATIENT
Start: 2018-04-16 | End: 2018-12-10 | Stop reason: SDUPTHER

## 2018-04-16 RX ORDER — PRAVASTATIN SODIUM 80 MG/1
TABLET ORAL
Qty: 90 TABLET | Refills: 3 | Status: SHIPPED | OUTPATIENT
Start: 2018-04-16 | End: 2018-06-25 | Stop reason: SDUPTHER

## 2018-04-16 RX ORDER — CLOPIDOGREL BISULFATE 75 MG/1
75 TABLET ORAL DAILY
Qty: 30 TABLET | Refills: 5 | Status: SHIPPED | OUTPATIENT
Start: 2018-04-16 | End: 2018-06-18 | Stop reason: SDUPTHER

## 2018-04-16 RX ORDER — CLOPIDOGREL BISULFATE 75 MG/1
TABLET ORAL
Qty: 90 TABLET | Refills: 3 | Status: SHIPPED | OUTPATIENT
Start: 2018-04-16 | End: 2018-06-25 | Stop reason: SDUPTHER

## 2018-04-16 RX ORDER — FUROSEMIDE 20 MG/1
TABLET ORAL
Qty: 30 TABLET | Refills: 7 | Status: SHIPPED | OUTPATIENT
Start: 2018-04-16

## 2018-04-25 LAB
ALBUMIN SERPL-MCNC: 3.8 G/DL (ref 3.6–5.1)
ALBUMIN/GLOB SERPL: 1.4 (CALC) (ref 1–2.5)
ALP SERPL-CCNC: 39 U/L (ref 40–115)
ALT SERPL-CCNC: 14 U/L (ref 9–46)
AST SERPL-CCNC: 16 U/L (ref 10–35)
BASOPHILS # BLD AUTO: 47 CELLS/UL (ref 0–200)
BASOPHILS NFR BLD AUTO: 0.6 %
BILIRUB SERPL-MCNC: 0.6 MG/DL (ref 0.2–1.2)
BUN SERPL-MCNC: 21 MG/DL (ref 7–25)
BUN/CREAT SERPL: ABNORMAL (CALC) (ref 6–22)
CALCIUM SERPL-MCNC: 9.4 MG/DL (ref 8.6–10.3)
CHLORIDE SERPL-SCNC: 106 MMOL/L (ref 98–110)
CHOLEST SERPL-MCNC: 150 MG/DL
CHOLEST/HDLC SERPL: 3.1 (CALC)
CK SERPL-CCNC: 213 U/L (ref 44–196)
CO2 SERPL-SCNC: 27 MMOL/L (ref 20–31)
CREAT SERPL-MCNC: 1.18 MG/DL (ref 0.7–1.18)
EOSINOPHIL # BLD AUTO: 356 CELLS/UL (ref 15–500)
EOSINOPHIL NFR BLD AUTO: 4.5 %
ERYTHROCYTE [DISTWIDTH] IN BLOOD BY AUTOMATED COUNT: 14.1 % (ref 11–15)
GLOBULIN SER CALC-MCNC: 2.7 G/DL (CALC) (ref 1.9–3.7)
GLUCOSE SERPL-MCNC: 108 MG/DL (ref 65–99)
HCT VFR BLD AUTO: 43.2 % (ref 38.5–50)
HDLC SERPL-MCNC: 49 MG/DL
HGB BLD-MCNC: 13.5 G/DL (ref 13.2–17.1)
LDLC SERPL CALC-MCNC: 84 MG/DL (CALC)
LYMPHOCYTES # BLD AUTO: 1943 CELLS/UL (ref 850–3900)
LYMPHOCYTES NFR BLD AUTO: 24.6 %
MCH RBC QN AUTO: 25.3 PG (ref 27–33)
MCHC RBC AUTO-ENTMCNC: 31.3 G/DL (ref 32–36)
MCV RBC AUTO: 81.1 FL (ref 80–100)
MONOCYTES # BLD AUTO: 885 CELLS/UL (ref 200–950)
MONOCYTES NFR BLD AUTO: 11.2 %
NEUTROPHILS # BLD AUTO: 4669 CELLS/UL (ref 1500–7800)
NEUTROPHILS NFR BLD AUTO: 59.1 %
NONHDLC SERPL-MCNC: 101 MG/DL (CALC)
PLATELET # BLD AUTO: 177 THOUSAND/UL (ref 140–400)
PMV BLD REES-ECKER: 12.3 FL (ref 7.5–12.5)
POTASSIUM SERPL-SCNC: 4.7 MMOL/L (ref 3.5–5.3)
PROT SERPL-MCNC: 6.5 G/DL (ref 6.1–8.1)
RBC # BLD AUTO: 5.33 MILLION/UL (ref 4.2–5.8)
SL AMB EGFR AFRICAN AMERICAN: 68 ML/MIN/1.73M2
SL AMB EGFR NON AFRICAN AMERICAN: 59 ML/MIN/1.73M2
SODIUM SERPL-SCNC: 140 MMOL/L (ref 135–146)
TRIGL SERPL-MCNC: 84 MG/DL
WBC # BLD AUTO: 7.9 THOUSAND/UL (ref 3.8–10.8)

## 2018-04-30 DIAGNOSIS — I25.10 ATHEROSCLEROTIC HEART DISEASE OF NATIVE CORONARY ARTERY WITHOUT ANGINA PECTORIS: ICD-10-CM

## 2018-06-04 ENCOUNTER — APPOINTMENT (EMERGENCY)
Dept: ULTRASOUND IMAGING | Facility: HOSPITAL | Age: 78
End: 2018-06-04
Payer: MEDICARE

## 2018-06-04 ENCOUNTER — HOSPITAL ENCOUNTER (EMERGENCY)
Facility: HOSPITAL | Age: 78
Discharge: HOME | End: 2018-06-04
Attending: EMERGENCY MEDICINE | Admitting: EMERGENCY MEDICINE
Payer: MEDICARE

## 2018-06-04 ENCOUNTER — APPOINTMENT (EMERGENCY)
Dept: CT IMAGING | Facility: HOSPITAL | Age: 78
End: 2018-06-04
Payer: MEDICARE

## 2018-06-04 VITALS
RESPIRATION RATE: 18 BRPM | DIASTOLIC BLOOD PRESSURE: 90 MMHG | BODY MASS INDEX: 29.05 KG/M2 | WEIGHT: 180 LBS | TEMPERATURE: 97 F | HEART RATE: 67 BPM | SYSTOLIC BLOOD PRESSURE: 157 MMHG | OXYGEN SATURATION: 98 %

## 2018-06-04 DIAGNOSIS — N45.2 ACUTE ORCHITIS: ICD-10-CM

## 2018-06-04 DIAGNOSIS — N50.3 EPIDIDYMAL CYST: ICD-10-CM

## 2018-06-04 DIAGNOSIS — N43.3 HYDROCELE: Primary | ICD-10-CM

## 2018-06-04 LAB
BACTERIA UR QL AUTO: ABNORMAL /HPF
BILIRUB UR QL STRIP: NEGATIVE
CLARITY UR: CLEAR
CLARITY, POC: CLEAR
COLOR UR: ABNORMAL
COLOR, POC: YELLOW
EXT BILIRUBIN, UA: NEGATIVE
EXT BLOOD URINE: NORMAL
EXT GLUCOSE, UA: NEGATIVE
EXT KETONES: NEGATIVE
EXT NITRITE, UA: NEGATIVE
EXT PH, UA: 0.2
EXT PROTEIN, UA: NEGATIVE
EXT SPECIFIC GRAVITY, UA: 1
EXT UROBILINOGEN: NEGATIVE
GLUCOSE UR STRIP-MCNC: NEGATIVE MG/DL
HGB UR QL STRIP.AUTO: ABNORMAL
KETONES UR STRIP-MCNC: NEGATIVE MG/DL
LEUKOCYTE ESTERASE UR QL STRIP: NEGATIVE
NITRITE UR QL STRIP: NEGATIVE
NON-SQ EPI CELLS URNS QL MICRO: ABNORMAL /HPF
PH UR STRIP.AUTO: 7 [PH] (ref 4.5–8)
PROT UR STRIP-MCNC: NEGATIVE MG/DL
RBC #/AREA URNS AUTO: ABNORMAL /HPF
SP GR UR STRIP.AUTO: 1.01 (ref 1–1.03)
UROBILINOGEN UR QL STRIP.AUTO: 0.2 E.U./DL
WBC # BLD EST: NEGATIVE 10*3/UL
WBC #/AREA URNS AUTO: ABNORMAL /HPF

## 2018-06-04 PROCEDURE — 74176 CT ABD & PELVIS W/O CONTRAST: CPT

## 2018-06-04 PROCEDURE — 76870 US EXAM SCROTUM: CPT

## 2018-06-04 PROCEDURE — 99284 EMERGENCY DEPT VISIT MOD MDM: CPT

## 2018-06-04 PROCEDURE — 81001 URINALYSIS AUTO W/SCOPE: CPT | Performed by: EMERGENCY MEDICINE

## 2018-06-04 RX ORDER — LEVOFLOXACIN 500 MG/1
500 TABLET, FILM COATED ORAL EVERY 24 HOURS
Qty: 6 TABLET | Refills: 0 | Status: SHIPPED | OUTPATIENT
Start: 2018-06-05 | End: 2018-06-11

## 2018-06-04 RX ORDER — IBUPROFEN 600 MG/1
600 TABLET ORAL ONCE
Status: COMPLETED | OUTPATIENT
Start: 2018-06-04 | End: 2018-06-04

## 2018-06-04 RX ORDER — LEVOFLOXACIN 500 MG/1
500 TABLET, FILM COATED ORAL ONCE
Status: COMPLETED | OUTPATIENT
Start: 2018-06-04 | End: 2018-06-04

## 2018-06-04 RX ADMIN — LEVOFLOXACIN 500 MG: 500 TABLET, FILM COATED ORAL at 09:24

## 2018-06-04 RX ADMIN — IBUPROFEN 600 MG: 600 TABLET ORAL at 06:02

## 2018-06-04 NOTE — ED PROVIDER NOTES
History  Chief Complaint   Patient presents with    Testicle Swelling     pt states his testicles have been swollen for 1 week  Tonight pt states the pain got so bad he could not walk      This 21-year-old man complains of testicular pain  He states 1 week ago he had right-sided testicular pain  This pain waxed and waned  As of several days ago the left testicle began hurting as well  Pain became worse tonight  Patient denies associated fever, nausea, vomiting, diarrhea, dysuria and hematuria  He does have some low back pain now that is a little worse than usual   He states this pain reminds him of a remote testicular infection that had cleared with antibiotics  Patient denies recent trauma  Prior to Admission Medications   Prescriptions Last Dose Informant Patient Reported? Taking? Omeprazole 20 MG TBEC  Self No No   Sig: Take 1 tablet by mouth 2 (two) times a day   amLODIPine (NORVASC) 10 mg tablet  Self Yes No   Sig: TAKE ONE TABLET BY MOUTH DAILY                                        (REFER TO PRESCRIPTION NOTES)  amLODIPine-benazepril (LOTREL) 5-40 MG per capsule   No No   Sig: take 1 capsule by mouth daily   aspirin 81 MG tablet  Self Yes No   Sig: Take 81 mg by mouth daily  aspirin 81 MG tablet  Self Yes No   Sig: Take 81 mg by mouth   carvedilol (COREG) 12 5 mg tablet  Self Yes No   Sig: Take 12 5 mg by mouth 2 (two) times a day with meals   carvedilol (COREG) 12 5 mg tablet  Self Yes No   Sig: Take 12 5 mg by mouth   clopidogrel (PLAVIX) 75 mg tablet  Self Yes No   Sig: Take 75 mg by mouth daily     clopidogrel (PLAVIX) 75 mg tablet   No No   Sig: take 1 tablet by mouth daily   clopidogrel (PLAVIX) 75 mg tablet   No No   Sig: Take 1 tablet (75 mg total) by mouth daily   diclofenac sodium (VOLTAREN) 1 %  Self Yes No   Sig: apply 2 grams to affected area four times a day   furosemide (LASIX) 20 mg tablet   No No   Sig: take 1 tablet by mouth daily as directed   isosorbide mononitrate (ISMO,MONOKET) 10 MG tablet  Self No No   Sig: Take 1 tablet (10 mg total) by mouth 3 (three) times a day   lisinopril (ZESTRIL) 40 mg tablet  Self Yes No   Sig: TAKE ONE TABLET BY MOUTH DAILY AS DIRECTED                            (REFER TO PRESCRIPTION NOTES)  metoprolol tartrate (LOPRESSOR) 50 mg tablet  Self Yes No   Sig: Take 50 mg by mouth   omeprazole (PriLOSEC) 20 mg delayed release capsule  Self Yes No   polyethylene glycol (MIRALAX) 17 g packet   Yes No   polyethylene glycol (MIRALAX) powder  Self Yes No   pravastatin (PRAVACHOL) 80 mg tablet  Self Yes No   Sig: Take 80 mg by mouth daily  pravastatin (PRAVACHOL) 80 mg tablet  Self Yes No   Sig: Take 80 mg by mouth   pravastatin (PRAVACHOL) 80 mg tablet   No No   Sig: take 1 tablet by mouth at bedtime      Facility-Administered Medications: None       Past Medical History:   Diagnosis Date    Coronary artery disease     Fractures     GERD (gastroesophageal reflux disease)     Heart disease     Hyperlipidemia     Hypertension     Myocardial infarction ([facility] [address] ) 2007    Inferior wall       Past Surgical History:   Procedure Laterality Date    CAROTID STENT      CHOLECYSTECTOMY      CORONARY STENT PLACEMENT      GA ESOPHAGOGASTRODUODENOSCOPY TRANSORAL DIAGNOSTIC N/A 1/25/2017    Procedure: EGD AND COLONOSCOPY;  Surgeon: Radha Aly MD;  Location: [facility]; Service: Gastroenterology       Family History   Problem Relation Age of Onset   Naomie Zhang Breast cancer Mother    Naomie Zhang Parkinsonism Father     Lymphoma Sister     Parkinsonism Brother     Prostate cancer Brother      I have reviewed and agree with the history as documented  Social History   Substance Use Topics    Smoking status: Former Smoker    Smokeless tobacco: Never Used    Alcohol use Yes      Comment: social        Review of Systems   Constitutional: Negative  HENT: Negative  Eyes: Negative  Respiratory: Negative  Cardiovascular: Negative  Gastrointestinal: Negative  Endocrine: Negative  Genitourinary: Positive for flank pain and testicular pain  Negative for decreased urine volume, difficulty urinating, discharge, dysuria, hematuria, penile pain, penile swelling and scrotal swelling  Skin: Negative  Allergic/Immunologic: Negative  Neurological: Negative  Hematological: Negative  Psychiatric/Behavioral: Negative  All other systems reviewed and are negative  Physical Exam  Physical Exam   Constitutional: He is oriented to person, place, and time  He appears well-developed and well-nourished  HENT:   Head: Normocephalic and atraumatic  Right Ear: External ear normal    Left Ear: External ear normal    Mouth/Throat: Oropharynx is clear and moist    Eyes: Conjunctivae and EOM are normal  Pupils are equal, round, and reactive to light  Neck: Normal range of motion  Neck supple  No JVD present  Cardiovascular: Normal rate, regular rhythm, normal heart sounds and intact distal pulses  No murmur heard  Pulmonary/Chest: Effort normal and breath sounds normal    Abdominal: Soft  Bowel sounds are normal  He exhibits no mass  There is no tenderness  There is no rebound and no guarding  No hernia  Genitourinary: Penis normal    Genitourinary Comments: No scrotal erythema or induration  Testicles appear normal size  No testicular mass or localized tenderness  No scrotal fluid or edema  The spermatic cords appear normal to palpation  No inguinal hernia palpated   Musculoskeletal: Normal range of motion  He exhibits no edema or tenderness  Lymphadenopathy:     He has no cervical adenopathy  Neurological: He is alert and oriented to person, place, and time  He has normal reflexes  No cranial nerve deficit  Coordination normal    Skin: Skin is warm and dry  Capillary refill takes less than 2 seconds  No rash noted  Psychiatric: He has a normal mood and affect   His behavior is normal    Nursing note and vitals reviewed        Vital Signs  ED Triage Vitals   Temperature Pulse Respirations Blood Pressure SpO2   06/04/18 0730 06/04/18 0507 06/04/18 0507 06/04/18 0507 06/04/18 0507   (!) 97 °F (36 1 °C) 73 18 (!) 185/87 98 %      Temp Source Heart Rate Source Patient Position - Orthostatic VS BP Location FiO2 (%)   06/04/18 0730 06/04/18 0600 06/04/18 0507 06/04/18 0507 --   Tympanic Monitor Sitting Left arm       Pain Score       06/04/18 0636       5           Vitals:    06/04/18 0730 06/04/18 0800 06/04/18 0900 06/04/18 0915   BP: 142/75 168/80 157/79 157/90   Pulse: 62 66  67   Patient Position - Orthostatic VS:           Visual Acuity  Visual Acuity      Most Recent Value   L Pupil Size (mm)  3   R Pupil Size (mm)  3          ED Medications  Medications   ibuprofen (MOTRIN) tablet 600 mg (600 mg Oral Given 6/4/18 0602)   levofloxacin (LEVAQUIN) tablet 500 mg (500 mg Oral Given 6/4/18 0924)       Diagnostic Studies  Results Reviewed     Procedure Component Value Units Date/Time    Urine Microscopic [[id number]]  (Abnormal) Collected:  06/04/18 0921    Lab Status:  Final result Specimen:  Urine from Urine, Clean Catch Updated:  06/04/18 1012     RBC, UA 2-4 (A) /hpf      WBC, UA None Seen /hpf      Epithelial Cells None Seen /hpf      Bacteria, UA None Seen /hpf     UA w Reflex to Microscopic w Reflex to Culture [[id number]]  (Abnormal) Collected:  06/04/18 0921    Lab Status:  Final result Specimen:  Urine from Urine, Clean Catch Updated:  06/04/18 0944     Color, UA Straw     Clarity, UA Clear     Specific Gravity, UA 1 010     pH, UA 7 0     Leukocytes, UA Negative     Nitrite, UA Negative     Protein, UA Negative mg/dl      Glucose, UA Negative mg/dl      Ketones, UA Negative mg/dl      Urobilinogen, UA 0 2 E U /dl      Bilirubin, UA Negative     Blood, UA Trace-Intact (A)    POCT urinalysis dipstick [[id number]]  (Normal) Resulted:  06/04/18 0532    Lab Status:  Final result Specimen:  Urine Updated:  06/04/18 6691 Color, UA yellow     Clarity, UA clear     EXT Glucose, UA (Ref: Negative) negative     EXT Bilirubin, UA (Ref: Negative) negative     EXT Ketones, UA (Ref: Negative) negative     EXT Spec Grav, UA 1 005     EXT Blood, UA (Ref: Negative) mod non hemolized     EXT pH, UA 0 2     EXT Protein, UA (Ref: Negative) negative     EXT Urobilinogen, UA (Ref: 0 2- 1 0) negative     EXT Leukocytes, UA (Ref: Negative) negative     EXT Nitrite, UA (Ref: Negative) negative                 US testes with doppler   Final Result by Zoraida Granado MD (06/04 1789)      1  No intratesticular mass or evidence of acute testicular torsion  2   Small mildly comp scattered left-sided hydrocele  3   Left epididymal head cyst       Workstation performed: [id number]         CT renal stone study abdomen pelvis without contrast   Final Result by Franki Pederson MD (06/04 0710)      No radiopaque urinary tract calculi or obstructive uropathy  Small isodense to slightly hyperdense contour deformity anterior mid to lower right kidney in the area of potentially complex cysts as described on the prior abdomen CTA  No significant interval change  This could be followed up with nonemergent renal    sonogram       No acute intra-abdominal or intrapelvic process insofar as can be detected on a noncontrast CT or significant interval change  Stable prostatomegaly                             Workstation performed: [id number]                    Procedures  Procedures       Phone Contacts  ED Phone Contact    ED Course  ED Course as of Jun 05 0520 Mon Jun 04, 2018   0705   The signed out to Dr Jesica Dutton                                MDM  Number of Diagnoses or Management Options  Acute orchitis:   Epididymal cyst:   Hydrocele:   Diagnosis management comments:  Case was signed out to Dr Jesica Dutton prior to ultrasound perfomance       Amount and/or Complexity of Data Reviewed  Clinical lab tests: ordered and reviewed  Tests in the radiology section of CPT®: ordered  Discuss the patient with other providers: yes      CritCare Time    Disposition  Final diagnoses:   Hydrocele - left testicle   Epididymal cyst   Acute orchitis     Time reflects when diagnosis was documented in both MDM as applicable and the Disposition within this note     Time User Action Codes Description Comment    6/4/2018  9:14 AM Silvia Ahle Add [N43 3] Hydrocele     6/4/2018  9:15 AM [place] Modify [N43 3] Hydrocele left testicle    6/4/2018  9:15 AM Silvia Ahle Add [N50 3] Epididymal cyst     6/4/2018  9:15 AM Silvia Ahle Add [N45 2] Acute orchitis       ED Disposition     ED Disposition Condition Comment    Discharge  [address] discharge to home/self care  Condition at discharge: Good        Follow-up Information     Follow up With Specialties Details Why Contact Info    Ihsan Barajas MD Hematology, Hematology and Oncology, Oncology  As needed [address] [address]  [phone]            Discharge Medication List as of 6/4/2018  9:18 AM      START taking these medications    Details   levofloxacin (LEVAQUIN) 500 mg tablet Take 1 tablet (500 mg total) by mouth every 24 hours for 6 days, Starting Tue 6/5/2018, Until Mon 6/11/2018, Normal         CONTINUE these medications which have NOT CHANGED    Details   amLODIPine (NORVASC) 10 mg tablet TAKE ONE TABLET BY MOUTH DAILY                                        (REFER TO PRESCRIPTION NOTES)  , Historical Med      amLODIPine-benazepril (LOTREL) 5-40 MG per capsule take 1 capsule by mouth daily, Normal      !! aspirin 81 MG tablet Take 81 mg by mouth daily  , Historical Med      !! aspirin 81 MG tablet Take 81 mg by mouth, Historical Med      !! carvedilol (COREG) 12 5 mg tablet Take 12 5 mg by mouth 2 (two) times a day with meals, Historical Med      !! carvedilol (COREG) 12 5 mg tablet Take 12 5 mg by mouth, Historical Med      !! clopidogrel (PLAVIX) 75 mg tablet Take 75 mg by mouth daily  , Historical Med      !! clopidogrel (PLAVIX) 75 mg tablet take 1 tablet by mouth daily, Normal      !! clopidogrel (PLAVIX) 75 mg tablet Take 1 tablet (75 mg total) by mouth daily, Starting Mon 4/16/2018, Normal      diclofenac sodium (VOLTAREN) 1 % apply 2 grams to affected area four times a day, Historical Med      furosemide (LASIX) 20 mg tablet take 1 tablet by mouth daily as directed, Normal      isosorbide mononitrate (ISMO,MONOKET) 10 MG tablet Take 1 tablet (10 mg total) by mouth 3 (three) times a day, Starting Mon 3/12/2018, Normal      lisinopril (ZESTRIL) 40 mg tablet TAKE ONE TABLET BY MOUTH DAILY AS DIRECTED                            (REFER TO PRESCRIPTION NOTES)  , Historical Med      metoprolol tartrate (LOPRESSOR) 50 mg tablet Take 50 mg by mouth, Historical Med      omeprazole (PriLOSEC) 20 mg delayed release capsule Starting Thu 1/11/2018, Historical Med      Omeprazole 20 MG TBEC Take 1 tablet by mouth 2 (two) times a day, Starting Thu 10/5/2017, No Print      polyethylene glycol (MIRALAX) 17 g packet Starting Mon 3/12/2018, Historical Med      polyethylene glycol (MIRALAX) powder Starting Thu 10/12/2017, Historical Med      !! pravastatin (PRAVACHOL) 80 mg tablet Take 80 mg by mouth daily  , Historical Med      !! pravastatin (PRAVACHOL) 80 mg tablet Take 80 mg by mouth, Historical Med      !! pravastatin (PRAVACHOL) 80 mg tablet take 1 tablet by mouth at bedtime, Normal       !! - Potential duplicate medications found  Please discuss with provider  No discharge procedures on file      ED Provider  Electronically Signed by           Sheron Simeon DO  06/05/18 0042

## 2018-06-04 NOTE — DISCHARGE INSTRUCTIONS
CT SCAN WITH ENLARGED PROSTATE AND RIGHT KIDNEY CYST, ULTRASOUND WITH EPIDIDYMAL CYST - NOTIFY PRIMARY CARE PHYSICIAN      Hydrocele   WHAT YOU NEED TO KNOW:   A hydrocele is a collection of fluid inside the scrotum  The scrotum holds the testicles  Hydroceles are simple or communicating  A simple hydrocele stays the same size  A communicating hydrocele gets bigger and smaller as fluid flows into and out of the scrotum  DISCHARGE INSTRUCTIONS:   Medicines:   · Pain medicine: You may need medicine to take away or decrease pain  ¨ Learn how to take your medicine  Ask what medicine and how much you should take  Be sure you know how, when, and how often to take it  ¨ Do not wait until the pain is severe before you take your medicine  Tell caregivers if your pain does not decrease  ¨ Pain medicine can make you dizzy or sleepy  Prevent falls by calling someone when you get out of bed or if you need help  · Take your medicine as directed  Contact your healthcare provider if you think your medicine is not helping or if you have side effects  Tell him or her if you are allergic to any medicine  Keep a list of the medicines, vitamins, and herbs you take  Include the amounts, and when and why you take them  Bring the list or the pill bottles to follow-up visits  Carry your medicine list with you in case of an emergency  Follow up with your healthcare provider or urologist as directed:  Write down your questions so you remember to ask them during your visits  Support: You may need to wear a fabric support device similar to a jock strap to decrease swelling  Wound care:  Ask your healthcare provider how to care for your incision after surgery  Contact your healthcare provider or urologist if:   · The swelling gets worse or does not go away  · Your scrotum is swollen and you have a fever  · Your surgery wound is swollen, red, or it is leaking green or yellow fluid      · You have questions or concerns about your condition or care  Seek care immediately or call 911 if:   · You have severe pain in your scrotum  · You see blood on your bandages and the amount is increasing  © 2017 [employer] Information is for End User's use only and may not be sold, redistributed or otherwise used for commercial purposes  All illustrations and images included in CareNotes® are the copyrighted property of [employer]  or [employer]  The above information is an  only  It is not intended as medical advice for individual conditions or treatments  Talk to your doctor, nurse or pharmacist before following any medical regimen to see if it is safe and effective for you  Orchitis   AMBULATORY CARE:   Orchitis  is inflammation or infection of one or both of your testicles  Common signs and symptoms include the following:   · Pain in one or both testicles    · Swelling of one or both testicles    · Fever    · Tenderness in your groin, lower abdomen, or scrotum    · Nausea and vomiting  Seek care immediately if:   · You have severe pain in your testicles, even after you take pain medicine  Contact your healthcare provider if:   · You have a hot, red, tender area on your testicles  · Your symptoms do not get better within 3 days of treatment or come back after treatment  · You have questions or concerns about your condition or care  Treatment  for orchitis depends on the cause of your orchitis  You may need any of the following:  · Medicine  can help decrease pain or swelling  You may also need medicine to treat a bacterial infection  · NSAIDs , such as ibuprofen, help decrease swelling, pain, and fever  This medicine is available with or without a doctor's order  NSAIDs can cause stomach bleeding or kidney problems in certain people  If you take blood thinner medicine, always ask if NSAIDs are safe for you  Always read the medicine label and follow directions  Do not give these medicines to children under 10months of age without direction from your child's healthcare provider  · Apply ice  on your testicles for 15 to 20 minutes every hour or as directed  Use an ice pack, or put crushed ice in a plastic bag  Cover it with a towel  Ice helps prevent tissue damage and decreases swelling and pain  · Rest in bed  as directed  Lying down will help to keep your scrotum elevated  · Scrotal support may be recommended  An athletic supporter provides scrotal support and may make you more comfortable when you stand  Ask your healthcare provider how to use an athletic supporter  Follow up with your healthcare provider as directed:  Write down your questions so you remember to ask them during your visits  © 2017 [address] [facility] Information is for End User's use only and may not be sold, redistributed or otherwise used for commercial purposes  All illustrations and images included in CareNotes® are the copyrighted property of A D A M [employer] Inc  or [employer]er[employer] Allen  The above information is an  only  It is not intended as medical advice for individual conditions or treatments  Talk to your doctor, nurse or pharmacist before following any medical regimen to see if it is safe and effective for you

## 2018-06-18 DIAGNOSIS — I25.10 CORONARY ARTERY DISEASE INVOLVING NATIVE CORONARY ARTERY OF NATIVE HEART WITHOUT ANGINA PECTORIS: ICD-10-CM

## 2018-06-18 NOTE — TELEPHONE ENCOUNTER
Patient's daughter called for refill on Plavix  Patient had medication refilled 4/16/18 for a 90 day supply  Patient's daughter aware that according to this information patient should have medication until 7/16/18  However, she would like medication refill sent as to not forget to get medication refilled

## 2018-06-23 RX ORDER — CLOPIDOGREL BISULFATE 75 MG/1
75 TABLET ORAL DAILY
Qty: 90 TABLET | Refills: 0 | Status: SHIPPED | OUTPATIENT
Start: 2018-06-23 | End: 2018-06-25 | Stop reason: SDUPTHER

## 2018-06-25 ENCOUNTER — OFFICE VISIT (OUTPATIENT)
Dept: CARDIOLOGY CLINIC | Facility: CLINIC | Age: 78
End: 2018-06-25
Payer: MEDICARE

## 2018-06-25 VITALS
SYSTOLIC BLOOD PRESSURE: 160 MMHG | WEIGHT: 181 LBS | DIASTOLIC BLOOD PRESSURE: 92 MMHG | HEART RATE: 73 BPM | HEIGHT: 66 IN | BODY MASS INDEX: 29.09 KG/M2

## 2018-06-25 DIAGNOSIS — E78.00 PURE HYPERCHOLESTEROLEMIA: Chronic | ICD-10-CM

## 2018-06-25 DIAGNOSIS — I25.10 CORONARY ARTERY DISEASE INVOLVING NATIVE CORONARY ARTERY OF NATIVE HEART WITHOUT ANGINA PECTORIS: ICD-10-CM

## 2018-06-25 DIAGNOSIS — I10 HYPERTENSION, UNSPECIFIED TYPE: Primary | ICD-10-CM

## 2018-06-25 DIAGNOSIS — R60.9 EDEMA, UNSPECIFIED TYPE: ICD-10-CM

## 2018-06-25 DIAGNOSIS — I51.89 DIASTOLIC DYSFUNCTION: ICD-10-CM

## 2018-06-25 DIAGNOSIS — I10 BENIGN ESSENTIAL HYPERTENSION: ICD-10-CM

## 2018-06-25 PROCEDURE — 93000 ELECTROCARDIOGRAM COMPLETE: CPT | Performed by: INTERNAL MEDICINE

## 2018-06-25 PROCEDURE — 99214 OFFICE O/P EST MOD 30 MIN: CPT | Performed by: INTERNAL MEDICINE

## 2018-06-25 RX ORDER — MELOXICAM 15 MG/1
15 TABLET ORAL DAILY PRN
Refills: 0 | COMMUNITY
Start: 2018-05-21

## 2018-06-25 NOTE — PROGRESS NOTES
Follow-up - Cardiology   Demi Carbone 65 y o  male MRN: [id number]        Problems    1  Hypertension, unspecified type  POCT ECG   2  Pure hypercholesterolemia     3  Coronary artery disease involving native coronary artery of native heart without angina pectoris     4  Benign essential hypertension     5  Diastolic dysfunction     6  Edema, unspecified type         Impression:    CAD -history of inferior wall MI status post stenting with preserved LV function, no new symptoms  Hypertension -uncontrolled today, this is unusual for him  May be due to testicular pain    Hyperlipidemia- under excellent control with LDL 84, total cholesterol 150 as a 4/18      Plan:    Orders Placed This Encounter   Procedures    POCT ECG    home blood pressure see more reasonable, but are significantly elevated in the office, may be related to his current testicular pain  Will not make any changes right now, spoke with his son today and recommended that they continue to check blood pressure at home and as long as it is under 150/90 in the short term until his testicular issues are improved I would not make any sudden antihypertensive med changes   6 month follow-up recommended with me    HPI:   Demi Carbone is a 65y o  year old male  With hypertension, hyperlipidemia, CAD, a prior history of inferior wall myocardial infarction status post RCA stenting  He denies recent chest pain, worsening anginal-type symptoms  He has accelerated hypertension today, he denies at he denies missing medication, he has hyperlipidemia, her recent lipids look well controlled with total cholesterol 150,  LDL 84  He was recently in the ER for testicular pain and swelling, placed on antibiotics with significant improvement in the swelling  He denies lower extremity swelling, orthopnea, edema, chest pain, lightheadedness  His EKG in the office today is unchanged  He saw his PCP today as well, has plans to see Urology          Review of Systems Constitutional: Negative  HENT: Negative  Eyes: Negative  Respiratory: Negative  Cardiovascular: Negative  Gastrointestinal: Negative  Endocrine: Negative  Genitourinary: Negative  Musculoskeletal: Negative  Skin: Negative  Neurological: Negative  Hematological: Negative  Psychiatric/Behavioral: Negative  Past Medical History:   Diagnosis Date    Coronary artery disease     Fractures     GERD (gastroesophageal reflux disease)     Heart disease     Hyperlipidemia     Hypertension     Myocardial infarction ([facility] [address] ) 2007    Inferior wall     History   Alcohol Use    Yes     Comment: social     History   Drug Use No     History   Smoking Status    Former Smoker   Smokeless Tobacco    Never Used       Allergies: Allergies   Allergen Reactions    Hydrochlorothiazide      Annotation - [id number]: PANCREATIC INFLAMMATION       Medications:     Current Outpatient Prescriptions:     amLODIPine (NORVASC) 10 mg tablet, TAKE ONE TABLET BY MOUTH DAILY                                        (REFER TO PRESCRIPTION NOTES)  , Disp: , Rfl:     amLODIPine-benazepril (LOTREL) 5-40 MG per capsule, take 1 capsule by mouth daily, Disp: 30 capsule, Rfl: 7    aspirin 81 MG tablet, Take 81 mg by mouth daily  , Disp: , Rfl:     aspirin 81 MG tablet, Take 81 mg by mouth, Disp: , Rfl:     carvedilol (COREG) 12 5 mg tablet, Take 12 5 mg by mouth 2 (two) times a day with meals, Disp: , Rfl:     carvedilol (COREG) 12 5 mg tablet, Take 12 5 mg by mouth, Disp: , Rfl:     clopidogrel (PLAVIX) 75 mg tablet, Take 75 mg by mouth daily  , Disp: , Rfl:     clopidogrel (PLAVIX) 75 mg tablet, take 1 tablet by mouth daily, Disp: 90 tablet, Rfl: 3    clopidogrel (PLAVIX) 75 mg tablet, Take 1 tablet (75 mg total) by mouth daily, Disp: 90 tablet, Rfl: 0    diclofenac sodium (VOLTAREN) 1 %, apply 2 grams to affected area four times a day, Disp: , Rfl:     furosemide (LASIX) 20 mg tablet, take 1 tablet by mouth daily as directed, Disp: 30 tablet, Rfl: 7    isosorbide mononitrate (ISMO,MONOKET) 10 MG tablet, Take 1 tablet (10 mg total) by mouth 3 (three) times a day, Disp: 90 tablet, Rfl: 3    lisinopril (ZESTRIL) 40 mg tablet, TAKE ONE TABLET BY MOUTH DAILY AS DIRECTED                            (REFER TO PRESCRIPTION NOTES)  , Disp: , Rfl:     metoprolol tartrate (LOPRESSOR) 50 mg tablet, Take 50 mg by mouth, Disp: , Rfl:     omeprazole (PriLOSEC) 20 mg delayed release capsule, , Disp: , Rfl: 0    Omeprazole 20 MG TBEC, Take 1 tablet by mouth 2 (two) times a day, Disp: , Rfl: 0    polyethylene glycol (MIRALAX) 17 g packet, , Disp: , Rfl: 0    polyethylene glycol (MIRALAX) powder, , Disp: , Rfl:     pravastatin (PRAVACHOL) 80 mg tablet, Take 80 mg by mouth daily  , Disp: , Rfl:     pravastatin (PRAVACHOL) 80 mg tablet, Take 80 mg by mouth, Disp: , Rfl:     pravastatin (PRAVACHOL) 80 mg tablet, take 1 tablet by mouth at bedtime, Disp: 90 tablet, Rfl: 3      Vitals:    06/25/18 1357   BP: (!) 178/92   Pulse: 73     Weight (last 2 days)     Date/Time   Weight    06/25/18 1357  82 1 (181)            Physical Exam   Constitutional: He is oriented to person, place, and time  No distress  HENT:   Head: Normocephalic and atraumatic  Eyes: Conjunctivae are normal  No scleral icterus  Neck: Normal range of motion  No JVD present  Cardiovascular: Normal rate, regular rhythm, normal heart sounds and intact distal pulses  No murmur heard  Pulmonary/Chest: Effort normal and breath sounds normal  He has no wheezes  He has no rales  Musculoskeletal: He exhibits no edema  Neurological: He is alert and oriented to person, place, and time  Skin: Skin is warm and dry  He is not diaphoretic           Laboratory Studies:  Lab Results   Component Value Date     10/17/2017     10/05/2017     10/04/2017     10/03/2017     11/02/2016     08/24/2016    K 4 2 10/17/2017    K 3 9 10/05/2017    K 3 4 (L) 10/04/2017    K 3 5 10/03/2017    K 4 4 11/02/2016    K 4 7 08/24/2016     10/17/2017     10/05/2017     10/04/2017     10/03/2017     11/02/2016     08/24/2016    CO2 27 10/17/2017    CO2 29 10/05/2017    CO2 27 10/04/2017    CO2 29 10/03/2017    CO2 27 11/02/2016    CO2 27 08/24/2016    GLUCOSE 104 10/05/2017    GLUCOSE 114 10/04/2017    GLUCOSE 99 10/03/2017    GLUCOSE 116 08/28/2015    GLUCOSE 106 12/12/2014    CREATININE 1 18 04/24/2018    CREATININE 1 05 10/17/2017    CREATININE 1 00 10/05/2017    CREATININE 0 91 10/04/2017    CREATININE 1 13 10/03/2017    CREATININE 1 12 11/02/2016    CREATININE 1 00 08/24/2016    BUN 21 04/24/2018    BUN 16 10/17/2017    BUN 16 10/05/2017    BUN 17 10/04/2017    BUN 19 10/03/2017    BUN 21 11/02/2016    BUN 18 08/24/2016    MG 2 0 10/04/2017    PHOS 2 9 10/04/2017     Lab Results   Component Value Date    WBC 8 95 10/05/2017    WBC 8 60 12/12/2014    RBC 5 51 10/05/2017    RBC 5 89 (H) 12/12/2014    HGB 13 5 04/24/2018    HGB 14 2 10/05/2017    HGB 14 7 12/12/2014    HCT 43 2 04/24/2018    HCT 42 5 10/05/2017    HCT 46 1 12/12/2014    MCV 81 1 04/24/2018    MCV 77 (L) 10/05/2017    MCV 78 (L) 12/12/2014    MCH 25 3 (L) 04/24/2018    MCH 25 8 (L) 10/05/2017    MCH 25 0 (L) 12/12/2014    RDW 14 1 04/24/2018    RDW 17 8 (H) 10/05/2017    RDW 16 1 (H) 12/12/2014     04/24/2018     10/05/2017     12/12/2014     NT-proBNP: No results for input(s): NTBNP in the last 72 hours     Coags:    Lipid Profile:   Lab Results   Component Value Date    CHOL 131 10/17/2017     Lab Results   Component Value Date    HDL 43 10/17/2017     Lab Results   Component Value Date    LDLCALC 41 10/04/2017     Lab Results   Component Value Date    TRIG 84 04/24/2018       Cardiac testing:   EKG reviewed personally:   Sinus rhythm, old inferior infarct, poor R-wave progression, no ischemic changes  Results for orders placed during the hospital encounter of 10/03/17   Echo complete with contrast if indicated    Narrative [address]  [facility], [address]  [phone]    Transthoracic Echocardiogram  2D, M-mode, Doppler, and Color Doppler    Study date:  04-Oct-2017    Patient: Xin Lucio  MR number: [id number]  Account number: [de-identified]  : 1940  Age: 67 years  Gender: Male  Status: Inpatient  Location: Bedside  Height: 66 in  Weight: 175 lb  BP: 193/ 93 mmHg    Indications: Chest pain  Diagnoses: R07 9 - Chest pain, unspecified    Sonographer:  Nusrat VICTOR, [facility]  Primary Physician:  Fabi Zambrano MD  Referring Physician:  Suzanne Conroy MD  Group:  [facility]  Interpreting Physician:  Nancie Young MD    SUMMARY    LEFT VENTRICLE:  Systolic function was normal by visual assessment  Ejection fraction was estimated to be 60 %  There were no regional wall motion abnormalities  Doppler parameters were consistent with abnormal left ventricular relaxation (grade 1 diastolic dysfunction)  LEFT ATRIUM:  The atrium was mildly dilated  AORTIC VALVE:  There was mild regurgitation  TRICUSPID VALVE:  There was trace regurgitation  PULMONIC VALVE:  There was trace regurgitation  HISTORY: PRIOR HISTORY: CAD, Edema, MI  Risk factors: hypertension and hypercholesterolemia  PRIOR PROCEDURES: Diagnostic cath  Stent  PROCEDURE: The procedure was performed at the bedside  This was a routine study  The transthoracic approach was used  The study included complete 2D imaging, M-mode, complete spectral Doppler, and color Doppler  Images were obtained from  the parasternal, apical, subcostal, and suprasternal notch acoustic windows  Image quality was adequate  LEFT VENTRICLE: Size was normal  Systolic function was normal by visual assessment  Ejection fraction was estimated to be 60 %  There were no regional wall motion abnormalities   Wall thickness was normal  DOPPLER: There was an increased  relative contribution of atrial contraction to ventricular filling  Doppler parameters were consistent with abnormal left ventricular relaxation (grade 1 diastolic dysfunction)  RIGHT VENTRICLE: The size was normal  Systolic function was normal  DOPPLER: Systolic pressure was not estimated  LEFT ATRIUM: The atrium was mildly dilated  RIGHT ATRIUM: Size was normal     MITRAL VALVE: Valve structure was normal  There was normal leaflet separation  DOPPLER: The transmitral velocity was within the normal range  There was no evidence for stenosis  There was no regurgitation  AORTIC VALVE: The valve was trileaflet  Leaflets exhibited normal thickness and normal cuspal separation  DOPPLER: Transaortic velocity was within the normal range  There was no evidence for stenosis  There was mild regurgitation  TRICUSPID VALVE: The valve structure was normal  There was normal leaflet separation  DOPPLER: The transtricuspid velocity was within the normal range  There was no evidence for stenosis  There was trace regurgitation  PULMONIC VALVE: Leaflets exhibited normal thickness, no calcification, and normal cuspal separation  DOPPLER: The transpulmonic velocity was within the normal range  There was trace regurgitation  PERICARDIUM: There was no pericardial effusion  AORTA: The root exhibited normal size  SYSTEMIC VEINS: IVC: The inferior vena cava was normal in size and course   Respirophasic changes were normal     SYSTEM MEASUREMENT TABLES    2D  %FS: 34 66 %  Ao Diam: 3 67 cm  EDV(Teich): 84 91 ml  EF(Teich): 64 09 %  ESV(Cube): 22 81 ml  ESV(Teich): 30 49 ml  IVSd: 0 8 cm  LA Area: 19 74 cm2  LA Diam: 3 72 cm  LVEDV MOD A4C: 92 89 ml  LVEF MOD A4C: 69 28 %  LVESV MOD A4C: 28 54 ml  LVIDd: 4 34 cm  LVIDs: 2 84 cm  LVLd A4C: 7 66 cm  LVLs A4C: 6 14 cm  LVOT Diam: 2 34 cm  LVPWd: 0 99 cm  RA Area: 13 4 cm2  RV Diam : 3 53 cm  SV MOD A4C: 64 36 ml  SV(Cube): 58 95 ml  SV(Teich): 54 42 ml    MM  TAPSE: 1 95 cm    PW  AVC: 385 58 ms  E': 0 05 m/s  E/E': 10 85  MV A [place]: 0 75 m/s  MV Dec [place]: 2 12 m/s2  MV DecT: 443 13 ms  MV E [place]: 0 56 m/s  MV E/A Ratio: 0 76    [employer] Accredited [facility]    Prepared and electronically signed by    Jeffery Amaya MD  Signed 04-Oct-2017 13:08:58       No results found for this or any previous visit  No results found for this or any previous visit  No results found for this or any previous visit  Viky Cardenas MD    Portions of the record may have been created with voice recognition software   Occasional wrong word or "sound a like" substitutions may have occurred due to the inherent limitations of voice recognition software   Read the chart carefully and recognize, using context, where substitutions have occurred

## 2018-07-13 DIAGNOSIS — I10 HYPERTENSION, UNSPECIFIED TYPE: ICD-10-CM

## 2018-07-17 RX ORDER — ISOSORBIDE MONONITRATE 10 MG/1
10 TABLET ORAL 3 TIMES DAILY
Qty: 90 TABLET | Refills: 11 | Status: SHIPPED | OUTPATIENT
Start: 2018-07-17

## 2018-10-09 ENCOUNTER — TRANSCRIBE ORDERS (OUTPATIENT)
Dept: ADMINISTRATIVE | Facility: HOSPITAL | Age: 78
End: 2018-10-09

## 2018-10-09 DIAGNOSIS — N28.1 ACQUIRED CYST OF KIDNEY: Primary | ICD-10-CM

## 2018-10-16 ENCOUNTER — HOSPITAL ENCOUNTER (OUTPATIENT)
Dept: ULTRASOUND IMAGING | Facility: HOSPITAL | Age: 78
Discharge: HOME | End: 2018-10-16
Payer: MEDICARE

## 2018-10-16 DIAGNOSIS — N28.1 ACQUIRED CYST OF KIDNEY: ICD-10-CM

## 2018-10-16 PROCEDURE — 76770 US EXAM ABDO BACK WALL COMP: CPT

## 2018-11-07 ENCOUNTER — TELEPHONE (OUTPATIENT)
Dept: CARDIOLOGY CLINIC | Facility: CLINIC | Age: 78
End: 2018-11-07

## 2018-11-07 NOTE — TELEPHONE ENCOUNTER
Received a fax from [facility] asking if pt can hold Plavix, ASA 7 days prior to prostate biopsy?  Please advise  RAMIREZ#[phone]

## 2018-11-15 DIAGNOSIS — I10 HYPERTENSION, UNSPECIFIED TYPE: Primary | ICD-10-CM

## 2018-11-23 RX ORDER — CARVEDILOL 12.5 MG/1
12.5 TABLET ORAL 2 TIMES DAILY WITH MEALS
Qty: 60 TABLET | Refills: 11 | OUTPATIENT
Start: 2018-11-23

## 2018-12-10 DIAGNOSIS — I10 BENIGN ESSENTIAL HYPERTENSION: ICD-10-CM

## 2018-12-19 RX ORDER — AMLODIPINE BESYLATE AND BENAZEPRIL HYDROCHLORIDE 5; 40 MG/1; MG/1
CAPSULE ORAL
Qty: 30 CAPSULE | Refills: 7 | Status: SHIPPED | OUTPATIENT
Start: 2018-12-19 | End: 2019-05-15

## 2019-01-14 DIAGNOSIS — I25.10 ATHEROSCLEROSIS OF NATIVE CORONARY ARTERY OF NATIVE HEART WITHOUT ANGINA PECTORIS: ICD-10-CM

## 2019-01-16 RX ORDER — PRAVASTATIN SODIUM 80 MG/1
TABLET ORAL
Qty: 90 TABLET | Refills: 3 | Status: SHIPPED | OUTPATIENT
Start: 2019-01-16

## 2019-05-13 DIAGNOSIS — E78.00 PURE HYPERCHOLESTEROLEMIA: Primary | Chronic | ICD-10-CM

## 2019-05-15 DIAGNOSIS — I10 BENIGN ESSENTIAL HYPERTENSION: ICD-10-CM

## 2019-05-15 RX ORDER — CLOPIDOGREL BISULFATE 75 MG/1
TABLET ORAL
Qty: 90 TABLET | Refills: 0 | Status: SHIPPED | OUTPATIENT
Start: 2019-05-15

## 2019-05-15 RX ORDER — AMLODIPINE BESYLATE AND BENAZEPRIL HYDROCHLORIDE 5; 40 MG/1; MG/1
1 CAPSULE ORAL DAILY
Qty: 90 CAPSULE | Refills: 2 | Status: SHIPPED | OUTPATIENT
Start: 2019-05-15